# Patient Record
Sex: MALE | Race: WHITE | Employment: FULL TIME | URBAN - METROPOLITAN AREA
[De-identification: names, ages, dates, MRNs, and addresses within clinical notes are randomized per-mention and may not be internally consistent; named-entity substitution may affect disease eponyms.]

---

## 2017-02-15 PROCEDURE — 87077 CULTURE AEROBIC IDENTIFY: CPT | Performed by: PHYSICIAN ASSISTANT

## 2017-02-15 PROCEDURE — 87086 URINE CULTURE/COLONY COUNT: CPT | Performed by: PHYSICIAN ASSISTANT

## 2017-04-17 ENCOUNTER — OFFICE VISIT (OUTPATIENT)
Dept: FAMILY MEDICINE CLINIC | Facility: CLINIC | Age: 42
End: 2017-04-17

## 2017-04-17 ENCOUNTER — APPOINTMENT (OUTPATIENT)
Dept: LAB | Facility: REFERENCE LAB | Age: 42
End: 2017-04-17
Attending: FAMILY MEDICINE
Payer: COMMERCIAL

## 2017-04-17 VITALS
WEIGHT: 152 LBS | HEIGHT: 68 IN | TEMPERATURE: 98 F | RESPIRATION RATE: 16 BRPM | OXYGEN SATURATION: 98 % | HEART RATE: 110 BPM | DIASTOLIC BLOOD PRESSURE: 70 MMHG | SYSTOLIC BLOOD PRESSURE: 128 MMHG | BODY MASS INDEX: 23.04 KG/M2

## 2017-04-17 DIAGNOSIS — R21 SCROTAL RASH: Primary | ICD-10-CM

## 2017-04-17 DIAGNOSIS — K90.41 GLUTEN INTOLERANCE: ICD-10-CM

## 2017-04-17 DIAGNOSIS — H92.02 LEFT EAR PAIN: ICD-10-CM

## 2017-04-17 PROCEDURE — 83516 IMMUNOASSAY NONANTIBODY: CPT | Performed by: FAMILY MEDICINE

## 2017-04-17 PROCEDURE — 99202 OFFICE O/P NEW SF 15 MIN: CPT | Performed by: FAMILY MEDICINE

## 2017-04-17 PROCEDURE — 36415 COLL VENOUS BLD VENIPUNCTURE: CPT | Performed by: FAMILY MEDICINE

## 2017-04-17 NOTE — PROGRESS NOTES
CC:  Patient presents with:  New Patient: rash all over body x1 week  Ear Pain: left ear x 3-4 days      HPI: 36year old male here with acute rash and left ear pain. Notes the rash started about a week ago in his groin area.   He thinks it is related to c social    Drug Use: No    Sexual Activity: Yes    Partners: Female     Other Topics Concern   None on file     Social History Narrative         No current outpatient prescriptions on file.     No Known Allergies      Vitals:    04/17/17  1630   BP: 128/70

## 2017-05-09 PROBLEM — N52.02 CORPORO-VENOUS OCCLUSIVE ERECTILE DYSFUNCTION: Status: ACTIVE | Noted: 2017-05-09

## 2017-06-20 ENCOUNTER — OFFICE VISIT (OUTPATIENT)
Dept: FAMILY MEDICINE CLINIC | Facility: CLINIC | Age: 42
End: 2017-06-20

## 2017-06-20 VITALS
WEIGHT: 152 LBS | BODY MASS INDEX: 23.04 KG/M2 | DIASTOLIC BLOOD PRESSURE: 80 MMHG | RESPIRATION RATE: 16 BRPM | OXYGEN SATURATION: 98 % | HEIGHT: 68 IN | HEART RATE: 112 BPM | TEMPERATURE: 98 F | SYSTOLIC BLOOD PRESSURE: 134 MMHG

## 2017-06-20 DIAGNOSIS — L24.9 IRRITANT CONTACT DERMATITIS, UNSPECIFIED TRIGGER: Primary | ICD-10-CM

## 2017-06-20 PROCEDURE — 99213 OFFICE O/P EST LOW 20 MIN: CPT | Performed by: FAMILY MEDICINE

## 2017-06-20 NOTE — PATIENT INSTRUCTIONS
Understanding Contact Dermatitis     A cool, moist compress can help reduce itching. Contact dermatitis is a common type of skin rash. It’s caused by something that touches the skin and makes it irritated and inflamed.  It can occur on skin on any par · Cool, moist compress. Use a clean damp cloth. Put it on the area for 20 to 30 minutes, 5 to 6 times a day for the first 3 days. · Steroid cream or ointment. You can apply this medicine several times a day on clean skin. · Oral corticosteroid.  Your heal

## 2017-06-20 NOTE — PROGRESS NOTES
CC:  Patient presents with:  Rash: on his and legs x1 week      HPI: 36year old male here for rash on his legs and back x 1 week. Slept at a friend's house one week ago and noticed a new rash on his back and his legs the next morning.   Notes his whole ba 60 g Rfl: 0   Sildenafil Citrate (VIAGRA) 50 MG Oral Tab Take 1 tablet (50 mg total) by mouth as needed for Erectile Dysfunction.  Disp: 2 tablet Rfl: 0       No Known Allergies      Vitals:    06/20/17  1546   BP: 134/80   Pulse: 112   Temp: 98.4 °F (36.9

## 2017-06-27 PROCEDURE — 81001 URINALYSIS AUTO W/SCOPE: CPT | Performed by: UROLOGY

## 2017-07-31 ENCOUNTER — OFFICE VISIT (OUTPATIENT)
Dept: FAMILY MEDICINE CLINIC | Facility: CLINIC | Age: 42
End: 2017-07-31

## 2017-07-31 VITALS
SYSTOLIC BLOOD PRESSURE: 116 MMHG | RESPIRATION RATE: 18 BRPM | HEIGHT: 68 IN | DIASTOLIC BLOOD PRESSURE: 70 MMHG | HEART RATE: 108 BPM | WEIGHT: 148 LBS | OXYGEN SATURATION: 98 % | BODY MASS INDEX: 22.43 KG/M2

## 2017-07-31 DIAGNOSIS — Z11.3 VENEREAL DISEASE SCREENING: ICD-10-CM

## 2017-07-31 DIAGNOSIS — R35.89 POLYURIA: ICD-10-CM

## 2017-07-31 DIAGNOSIS — M25.471 EDEMA OF RIGHT ANKLE: ICD-10-CM

## 2017-07-31 DIAGNOSIS — Z00.01 ENCOUNTER FOR ROUTINE ADULT HEALTH EXAMINATION WITH ABNORMAL FINDINGS: Primary | ICD-10-CM

## 2017-07-31 DIAGNOSIS — Z13.220 LIPID SCREENING: ICD-10-CM

## 2017-07-31 PROCEDURE — 99396 PREV VISIT EST AGE 40-64: CPT | Performed by: FAMILY MEDICINE

## 2017-07-31 NOTE — PATIENT INSTRUCTIONS
Prevention Guidelines, Men Ages 36 to 52  Screening tests and vaccines are an important part of managing your health. Health counseling is essential, too. Below are guidelines for these, for men ages 36 to 52.  Talk with your healthcare provider to make s Hepatitis A Men at increased risk for infection – talk with your healthcare provider 2 doses given at least 6 months apart   Hepatitis B Men at increased risk for infection – talk with your healthcare provider 3 doses over 6 months; second dose should be g © 8477-6568 [address], [address] [place]. All rights reserved. This information is not intended as a substitute for professional medical care. Always follow your healthcare professional's instructions.

## 2017-07-31 NOTE — PROGRESS NOTES
Seth Mayfield is a 36year old male who presents for a complete physical exam.   HPI:     Concerns: Right ankle swelling off and on for about a week. Denies any ankle pain or rash, no calf pain. Worsens at the end of the day.    Reports he has been urinating growth   2004: OTHER SURGICAL HISTORY      Comment: scoliosis repair   11/22/16: OTHER SURGICAL HISTORY      Comment: cysto- Dr. Arnold Nolan   Family History   Problem Relation Age of Onset   • Diabetes Father    • Heart Disorder Father      CABG   • Hypertension intact    No results found for: CHOLEST, HDL, LDL, TRIGLY, AST, ALT   ASSESSMENT AND PLAN:   Rc Blas is a 36year old male who presents for a complete physical exam.    Encounter for routine adult health examination with abnormal findings  (primary en

## 2017-08-04 ENCOUNTER — APPOINTMENT (OUTPATIENT)
Dept: LAB | Facility: REFERENCE LAB | Age: 42
End: 2017-08-04
Attending: FAMILY MEDICINE
Payer: COMMERCIAL

## 2017-08-04 DIAGNOSIS — R35.89 POLYURIA: ICD-10-CM

## 2017-08-04 DIAGNOSIS — Z11.3 VENEREAL DISEASE SCREENING: ICD-10-CM

## 2017-08-04 DIAGNOSIS — M25.471 EDEMA OF RIGHT ANKLE: ICD-10-CM

## 2017-08-04 DIAGNOSIS — Z13.220 LIPID SCREENING: ICD-10-CM

## 2017-08-04 LAB
ANION GAP SERPL CALC-SCNC: 7 MMOL/L (ref 0–18)
BACTERIA UR QL AUTO: NEGATIVE /HPF
BACTERIA UR QL AUTO: NEGATIVE /HPF
BILIRUB UR QL: NEGATIVE
BUN SERPL-MCNC: 19 MG/DL (ref 8–20)
BUN/CREAT SERPL: 24.7 (ref 10–20)
CALCIUM SERPL-MCNC: 9.3 MG/DL (ref 8.5–10.5)
CHLORIDE SERPL-SCNC: 103 MMOL/L (ref 95–110)
CHOLEST SERPL-MCNC: 164 MG/DL (ref 110–200)
CLARITY UR: CLEAR
CO2 SERPL-SCNC: 29 MMOL/L (ref 22–32)
COLOR UR: YELLOW
CREAT SERPL-MCNC: 0.77 MG/DL (ref 0.5–1.5)
GLUCOSE SERPL-MCNC: 98 MG/DL (ref 70–99)
GLUCOSE UR-MCNC: NEGATIVE MG/DL
HDLC SERPL-MCNC: 62 MG/DL
HGB UR QL STRIP.AUTO: NEGATIVE
KETONES UR-MCNC: NEGATIVE MG/DL
LDLC SERPL CALC-MCNC: 87 MG/DL (ref 0–99)
LEUKOCYTE ESTERASE UR QL STRIP.AUTO: NEGATIVE
NITRITE UR QL STRIP.AUTO: NEGATIVE
NONHDLC SERPL-MCNC: 102 MG/DL
OSMOLALITY UR CALC.SUM OF ELEC: 290 MOSM/KG (ref 275–295)
PH UR: 6 [PH] (ref 5–8)
POTASSIUM SERPL-SCNC: 4 MMOL/L (ref 3.3–5.1)
PROT UR-MCNC: NEGATIVE MG/DL
RBC #/AREA URNS AUTO: <1 /HPF
RBC #/AREA URNS AUTO: <1 /HPF
SODIUM SERPL-SCNC: 139 MMOL/L (ref 136–144)
SP GR UR STRIP: 1.02 (ref 1–1.03)
TRIGL SERPL-MCNC: 77 MG/DL (ref 1–149)
UROBILINOGEN UR STRIP-ACNC: <2
VIT C UR-MCNC: NEGATIVE MG/DL
WBC #/AREA URNS AUTO: 1 /HPF
WBC #/AREA URNS AUTO: 1 /HPF

## 2017-08-04 PROCEDURE — 87389 HIV-1 AG W/HIV-1&-2 AB AG IA: CPT | Performed by: FAMILY MEDICINE

## 2017-08-04 PROCEDURE — 86780 TREPONEMA PALLIDUM: CPT | Performed by: FAMILY MEDICINE

## 2017-08-04 PROCEDURE — 36415 COLL VENOUS BLD VENIPUNCTURE: CPT | Performed by: FAMILY MEDICINE

## 2017-08-04 PROCEDURE — 81001 URINALYSIS AUTO W/SCOPE: CPT | Performed by: FAMILY MEDICINE

## 2017-08-04 PROCEDURE — 80048 BASIC METABOLIC PNL TOTAL CA: CPT | Performed by: FAMILY MEDICINE

## 2017-08-04 PROCEDURE — 80061 LIPID PANEL: CPT | Performed by: FAMILY MEDICINE

## 2017-08-06 LAB
C TRACH DNA SPEC QL NAA+PROBE: NEGATIVE
N GONORRHOEA DNA SPEC QL NAA+PROBE: NEGATIVE

## 2017-08-07 LAB — HIV1+2 AB SERPL QL IA: NONREACTIVE

## 2017-08-08 DIAGNOSIS — M25.473 ANKLE EDEMA: ICD-10-CM

## 2017-08-08 DIAGNOSIS — R01.1 SYSTOLIC EJECTION MURMUR: Primary | ICD-10-CM

## 2017-08-08 LAB — T PALLIDUM AB SER QL: NEGATIVE

## 2017-08-15 NOTE — TELEPHONE ENCOUNTER
Patient itching on arms and legs for three - four days. Wants to know if he should come in or get something over the counter.

## 2017-08-15 NOTE — TELEPHONE ENCOUNTER
Patient concerned with possible bed bug infestation that he caught while at a hotel in [place].  Note he and his girlfriend have had bugs on them every morning since being back on Saturday, deny any bites or marks and go away with showering but does have

## 2017-10-26 ENCOUNTER — HOSPITAL ENCOUNTER (OUTPATIENT)
Dept: CV DIAGNOSTICS | Facility: HOSPITAL | Age: 42
Discharge: HOME | End: 2017-10-26
Attending: FAMILY MEDICINE
Payer: COMMERCIAL

## 2017-10-26 DIAGNOSIS — M25.473 ANKLE EDEMA: ICD-10-CM

## 2017-10-26 DIAGNOSIS — R01.1 SYSTOLIC EJECTION MURMUR: ICD-10-CM

## 2017-10-26 PROCEDURE — 93306 TTE W/DOPPLER COMPLETE: CPT | Performed by: FAMILY MEDICINE

## 2017-11-03 PROCEDURE — 87086 URINE CULTURE/COLONY COUNT: CPT | Performed by: PHYSICIAN ASSISTANT

## 2017-12-21 PROCEDURE — 87086 URINE CULTURE/COLONY COUNT: CPT | Performed by: OBSTETRICS & GYNECOLOGY

## 2017-12-23 ENCOUNTER — HOSPITAL ENCOUNTER (OUTPATIENT)
Age: 42
Discharge: HOME | End: 2017-12-23
Attending: FAMILY MEDICINE
Payer: COMMERCIAL

## 2017-12-23 VITALS
DIASTOLIC BLOOD PRESSURE: 89 MMHG | WEIGHT: 150 LBS | HEIGHT: 68 IN | BODY MASS INDEX: 22.73 KG/M2 | OXYGEN SATURATION: 100 % | RESPIRATION RATE: 22 BRPM | HEART RATE: 102 BPM | SYSTOLIC BLOOD PRESSURE: 139 MMHG | TEMPERATURE: 97 F

## 2017-12-23 DIAGNOSIS — J06.9 VIRAL UPPER RESPIRATORY TRACT INFECTION: Primary | ICD-10-CM

## 2017-12-23 PROCEDURE — 99213 OFFICE O/P EST LOW 20 MIN: CPT

## 2017-12-23 PROCEDURE — 99204 OFFICE O/P NEW MOD 45 MIN: CPT

## 2017-12-23 RX ORDER — BENZONATATE 200 MG/1
200 CAPSULE ORAL 3 TIMES DAILY PRN
Qty: 21 CAPSULE | Refills: 0 | Status: SHIPPED | OUTPATIENT
Start: 2017-12-23 | End: 2017-12-28 | Stop reason: ALTCHOICE

## 2017-12-23 NOTE — ED PROVIDER NOTES
Patient Seen [address]: [address]    History   Patient presents with:  Cough/URI    Stated Complaint: cough    HPI    36year old patient with PMHx significant for Scoliosis presents with cough, congestion for 3 days.      Cough Family history reviewed and not pertinent to today's presenting problem  Smoking status: Never Smoker                                                              Smokeless tobacco: Never Used                      Alcohol use: Yes           3.6 oz/week upper respiratory tract infection  (primary encounter diagnosis)    Disposition:  Discharge    Follow-up:  Cheryl Burris, [address] [facility],[address] 21 [phone]    In 3 days  if no improvement or sooner if worse      Medications Pre

## 2017-12-25 ENCOUNTER — HOSPITAL ENCOUNTER (OUTPATIENT)
Age: 42
Discharge: HOME | End: 2017-12-25
Attending: FAMILY MEDICINE
Payer: COMMERCIAL

## 2017-12-25 VITALS
SYSTOLIC BLOOD PRESSURE: 119 MMHG | TEMPERATURE: 98 F | DIASTOLIC BLOOD PRESSURE: 70 MMHG | HEIGHT: 68 IN | RESPIRATION RATE: 20 BRPM | HEART RATE: 108 BPM | WEIGHT: 150 LBS | OXYGEN SATURATION: 100 % | BODY MASS INDEX: 22.73 KG/M2

## 2017-12-25 DIAGNOSIS — J06.9 VIRAL UPPER RESPIRATORY TRACT INFECTION: Primary | ICD-10-CM

## 2017-12-25 PROCEDURE — 99212 OFFICE O/P EST SF 10 MIN: CPT

## 2017-12-25 NOTE — ED PROVIDER NOTES
Patient Seen in: [address] [facility]    History   Patient presents with:  Cough/URI    Stated Complaint: Cough    HPI    Pt is a 35 yo with nasal congestion and cough. There are no fevers, throat pain or ear pain.  He is using fei Oropharynx is clear and moist.   Nasal congestion, clear   Eyes: Conjunctivae and EOM are normal. Pupils are equal, round, and reactive to light. Neck: Normal range of motion. Neck supple. Cardiovascular: Normal rate and regular rhythm.     Pulmonary/Ch

## 2017-12-28 ENCOUNTER — OFFICE VISIT (OUTPATIENT)
Dept: FAMILY MEDICINE CLINIC | Facility: CLINIC | Age: 42
End: 2017-12-28

## 2017-12-28 VITALS
WEIGHT: 150 LBS | TEMPERATURE: 99 F | SYSTOLIC BLOOD PRESSURE: 114 MMHG | DIASTOLIC BLOOD PRESSURE: 72 MMHG | OXYGEN SATURATION: 98 % | HEIGHT: 68 IN | HEART RATE: 88 BPM | BODY MASS INDEX: 22.73 KG/M2

## 2017-12-28 DIAGNOSIS — R11.10 POST-TUSSIVE EMESIS: Primary | ICD-10-CM

## 2017-12-28 PROCEDURE — 99213 OFFICE O/P EST LOW 20 MIN: CPT | Performed by: FAMILY MEDICINE

## 2017-12-28 PROCEDURE — 87798 DETECT AGENT NOS DNA AMP: CPT | Performed by: FAMILY MEDICINE

## 2017-12-28 RX ORDER — CODEINE PHOSPHATE AND GUAIFENESIN 10; 100 MG/5ML; MG/5ML
5 SOLUTION ORAL EVERY 6 HOURS PRN
Qty: 1 BOTTLE | Refills: 0 | Status: SHIPPED | OUTPATIENT
Start: 2017-12-28 | End: 2018-01-02

## 2017-12-28 RX ORDER — AZITHROMYCIN 250 MG/1
TABLET, FILM COATED ORAL
Qty: 6 TABLET | Refills: 0 | Status: SHIPPED | OUTPATIENT
Start: 2017-12-28 | End: 2018-02-23 | Stop reason: ALTCHOICE

## 2017-12-28 NOTE — PROGRESS NOTES
CC:  Patient presents with:  Cough: x1.5 week, feels like its getting worse      HPI: 36year old male here with cough x 1.5 weeks that is getting worse. Went to walk-in clinic and immediate care due to chronic cough but has not gotten any better.   Was gi on file     Social History Narrative   None on file         Current Outpatient Prescriptions:  Pseudoephedrine-Guaifenesin (NASAL DECONGESTANT OR) Take by mouth.  Disp:  Rfl:    benzonatate 200 MG Oral Cap Take 1 capsule (200 mg total) by mouth 3 (three) ti

## 2018-01-21 ENCOUNTER — HOSPITAL ENCOUNTER (OUTPATIENT)
Age: 43
Discharge: HOME | End: 2018-01-21
Attending: PEDIATRICS
Payer: COMMERCIAL

## 2018-01-21 VITALS
HEART RATE: 103 BPM | HEIGHT: 68 IN | RESPIRATION RATE: 16 BRPM | TEMPERATURE: 97 F | BODY MASS INDEX: 22.73 KG/M2 | DIASTOLIC BLOOD PRESSURE: 76 MMHG | WEIGHT: 150 LBS | OXYGEN SATURATION: 100 % | SYSTOLIC BLOOD PRESSURE: 123 MMHG

## 2018-01-21 DIAGNOSIS — R23.2 FACIAL FLUSHING: Primary | ICD-10-CM

## 2018-01-21 PROCEDURE — 99212 OFFICE O/P EST SF 10 MIN: CPT

## 2018-01-21 PROCEDURE — 99213 OFFICE O/P EST LOW 20 MIN: CPT

## 2018-01-21 NOTE — ED PROVIDER NOTES
Patient Seen in: [facility]    History   Patient presents with: Allergic Rxn Allergies (immune)    Stated Complaint: ALLERGIC REACTION    HPI    78-year-old male presents a few hours after having a reaction after lunch. Never Smoker                                                              Smokeless tobacco: Never Used                      Alcohol use: Yes           3.6 oz/week     Cans of beer: 6 per week     Comment: social      Review of Systems    Positive for stat needed      Medications Prescribed:  Current Discharge Medication List

## 2018-01-21 NOTE — ED INITIAL ASSESSMENT (HPI)
Pt reports was eating seafood around noon and started to get red/flushed also states was unable to urinate at the time. Pt denies any hives denies SOB no drooling or stridor noted. Pt states has eaten seafood in the past with no problems or reactions.  Pt h

## 2018-03-26 ENCOUNTER — OFFICE VISIT (OUTPATIENT)
Dept: FAMILY MEDICINE CLINIC | Facility: CLINIC | Age: 43
End: 2018-03-26

## 2018-03-26 VITALS
HEART RATE: 117 BPM | DIASTOLIC BLOOD PRESSURE: 70 MMHG | OXYGEN SATURATION: 98 % | SYSTOLIC BLOOD PRESSURE: 116 MMHG | BODY MASS INDEX: 22.73 KG/M2 | WEIGHT: 150 LBS | HEIGHT: 68 IN

## 2018-03-26 DIAGNOSIS — N50.812 LEFT TESTICULAR PAIN: Primary | ICD-10-CM

## 2018-03-26 DIAGNOSIS — G57.92 ILIOINGUINAL NEURALGIA OF LEFT SIDE: ICD-10-CM

## 2018-03-26 DIAGNOSIS — M54.16 LUMBAR RADICULOPATHY: ICD-10-CM

## 2018-03-26 PROCEDURE — 99213 OFFICE O/P EST LOW 20 MIN: CPT | Performed by: FAMILY MEDICINE

## 2018-03-26 RX ORDER — NAPROXEN 500 MG/1
500 TABLET ORAL 2 TIMES DAILY WITH MEALS
Qty: 28 TABLET | Refills: 0 | Status: SHIPPED | OUTPATIENT
Start: 2018-03-26 | End: 2018-04-09

## 2018-03-26 NOTE — PATIENT INSTRUCTIONS
Testicular Pain, Unclear Cause  You have had pain in one or both testicles. Based on your exam today, the exact cause of your pain is not certain. But your condition does not appear to be dangerous. Testicles are very sensitive.  Even a small injury can c · Numbness or weakness in the leg  · Shrinking of the testicle  · Blood in your urine  Date Last Reviewed: 10/1/2016  © 8083-8751 [employer]. [address] [facility], [address] [place] [address]. All rights reserved.  This information is not intended as

## 2018-03-26 NOTE — PROGRESS NOTES
aCC:  Patient presents with:  Leg Pain: left leg pain that shoots down his leg started on friday night, no injuries      HPI: 39year old male here to discuss left leg shooting pain that started on Friday.   Notes he was sitting down watching TV when he got Years of education: N/A  Number of children: N/A     Occupational History  None on file     Social History Main Topics   Smoking status: Never Smoker    Smokeless tobacco: Never Used    Alcohol use Yes  3.6 oz/week    6 Cans of beer per week         Commen physical therapy  - Referred to Neuroscience PT department at [address] [place] [address] William Jade,   03/26/18  12:47 PM

## 2018-03-27 ENCOUNTER — HOSPITAL ENCOUNTER (OUTPATIENT)
Dept: ULTRASOUND IMAGING | Age: 43
Discharge: HOME | End: 2018-03-27
Attending: FAMILY MEDICINE
Payer: COMMERCIAL

## 2018-03-27 DIAGNOSIS — N50.812 LEFT TESTICULAR PAIN: ICD-10-CM

## 2018-03-27 PROCEDURE — 93975 VASCULAR STUDY: CPT | Performed by: FAMILY MEDICINE

## 2018-03-27 PROCEDURE — 76870 US EXAM SCROTUM: CPT | Performed by: FAMILY MEDICINE

## 2018-04-23 ENCOUNTER — TELEPHONE (OUTPATIENT)
Dept: PHYSICAL THERAPY | Age: 43
End: 2018-04-23

## 2018-05-03 ENCOUNTER — APPOINTMENT (OUTPATIENT)
Dept: PHYSICAL THERAPY | Age: 43
End: 2018-05-03
Attending: FAMILY MEDICINE
Payer: COMMERCIAL

## 2018-05-03 PROCEDURE — 87086 URINE CULTURE/COLONY COUNT: CPT | Performed by: OBSTETRICS & GYNECOLOGY

## 2018-05-03 PROCEDURE — 81015 MICROSCOPIC EXAM OF URINE: CPT | Performed by: OBSTETRICS & GYNECOLOGY

## 2018-05-10 ENCOUNTER — OFFICE VISIT (OUTPATIENT)
Dept: PHYSICAL THERAPY | Age: 43
End: 2018-05-10
Attending: FAMILY MEDICINE
Payer: COMMERCIAL

## 2018-05-10 DIAGNOSIS — G57.92 ILIOINGUINAL NEURALGIA OF LEFT SIDE: ICD-10-CM

## 2018-05-10 DIAGNOSIS — M54.16 LUMBAR RADICULOPATHY: ICD-10-CM

## 2018-05-10 PROCEDURE — 97161 PT EVAL LOW COMPLEX 20 MIN: CPT

## 2018-05-10 PROCEDURE — 97110 THERAPEUTIC EXERCISES: CPT

## 2018-05-10 NOTE — PROGRESS NOTES
PELVIC FLOOR EVALUATION:   Referring Physician: Dr. Marino Mckeon  Diagnosis: Ilioinguinal neuralgia of left side (G57.92)  Lumbar radiculopathy (M54.16)  Date of Onset: 1.5 years prior Date of Service: 5/101/18     PATIENT SUMMARY   Patient is a 39year old y/o with pain while lying down. Functional limitations include painful urination, painful intercourse, and sleeping.  Objective testing demonstrates decreased ROM in L hip, TTP in L groin and adductors, decreased LE strength, poor hydration habits, and poor loren months  Pressure complaints: no  Urinary Frequency: every 2 hours  Urine Stream: split stream  Amount: normal  Leaking occurs: NA  Episodes of Leakage: NA  Amount of leakage: NA  Pad use: NA  Pad Change frequency: NA  Nocturia: no  Fluid Intake: 40 oz of w Squat Decreased*pain Decreased *pain    Single Leg Squat Decreased Decreased    Single Leg Balance NT  NT      Pelvis  L upslip of ilium, posterior rotation       PLAN OF CARE:      Today’s Treatment and Response:  Patient education provided on objective f

## 2018-05-17 ENCOUNTER — OFFICE VISIT (OUTPATIENT)
Dept: PHYSICAL THERAPY | Age: 43
End: 2018-05-17
Attending: FAMILY MEDICINE
Payer: COMMERCIAL

## 2018-05-17 DIAGNOSIS — M54.16 LUMBAR RADICULOPATHY: ICD-10-CM

## 2018-05-17 DIAGNOSIS — G57.92 ILIOINGUINAL NEURALGIA OF LEFT SIDE: ICD-10-CM

## 2018-05-17 PROCEDURE — 97110 THERAPEUTIC EXERCISES: CPT

## 2018-05-17 PROCEDURE — 97140 MANUAL THERAPY 1/> REGIONS: CPT

## 2018-05-17 NOTE — PROGRESS NOTES
Diagnosis: Ilioinguinal neuralgia of left side (G57.92)  Lumbar radiculopathy (M54.16)     Authorized # of Visits: 10       Next MD visit: not made  Referring Physician: Dr. Chet Easton  Fall Risk: standard         Precautions: n/a           Medication Changes si

## 2018-05-24 ENCOUNTER — OFFICE VISIT (OUTPATIENT)
Dept: PHYSICAL THERAPY | Age: 43
End: 2018-05-24
Attending: FAMILY MEDICINE
Payer: COMMERCIAL

## 2018-05-24 DIAGNOSIS — M54.16 LUMBAR RADICULOPATHY: ICD-10-CM

## 2018-05-24 DIAGNOSIS — G57.92 ILIOINGUINAL NEURALGIA OF LEFT SIDE: ICD-10-CM

## 2018-05-24 PROCEDURE — 97140 MANUAL THERAPY 1/> REGIONS: CPT

## 2018-05-24 PROCEDURE — 97110 THERAPEUTIC EXERCISES: CPT

## 2018-05-24 NOTE — PROGRESS NOTES
Diagnosis: Ilioinguinal neuralgia of left side (G57.92)  Lumbar radiculopathy (M54.16)     Authorized # of Visits: 10       Next MD visit: not made  Referring Physician: Dr. Royer Meier  Fall Risk: standard         Precautions: n/a           Medication Changes si

## 2018-05-31 ENCOUNTER — OFFICE VISIT (OUTPATIENT)
Dept: PHYSICAL THERAPY | Age: 43
End: 2018-05-31
Attending: FAMILY MEDICINE
Payer: COMMERCIAL

## 2018-05-31 DIAGNOSIS — G57.92 ILIOINGUINAL NEURALGIA OF LEFT SIDE: ICD-10-CM

## 2018-05-31 DIAGNOSIS — M54.16 LUMBAR RADICULOPATHY: ICD-10-CM

## 2018-05-31 PROCEDURE — 97110 THERAPEUTIC EXERCISES: CPT

## 2018-05-31 PROCEDURE — 97140 MANUAL THERAPY 1/> REGIONS: CPT

## 2018-05-31 NOTE — PROGRESS NOTES
Diagnosis: Ilioinguinal neuralgia of left side (G57.92)  Lumbar radiculopathy (M54.16)     Authorized # of Visits: 10       Next MD visit: not made  Referring Physician: Dr. Selam Murray  Fall Risk: standard         Precautions: n/a           Medication Changes si Charges: 1MT, 2TE

## 2018-06-07 ENCOUNTER — OFFICE VISIT (OUTPATIENT)
Dept: PHYSICAL THERAPY | Age: 43
End: 2018-06-07
Attending: FAMILY MEDICINE
Payer: COMMERCIAL

## 2018-06-07 DIAGNOSIS — M54.16 LUMBAR RADICULOPATHY: ICD-10-CM

## 2018-06-07 DIAGNOSIS — G57.92 ILIOINGUINAL NEURALGIA OF LEFT SIDE: ICD-10-CM

## 2018-06-07 PROCEDURE — 97110 THERAPEUTIC EXERCISES: CPT

## 2018-06-07 PROCEDURE — 97140 MANUAL THERAPY 1/> REGIONS: CPT

## 2018-06-07 NOTE — PROGRESS NOTES
Diagnosis: Ilioinguinal neuralgia of left side (G57.92)  Lumbar radiculopathy (M54.16)     Authorized # of Visits: 10       Next MD visit: not made  Referring Physician: Dr. Kathy Culver  Fall Risk: standard         Precautions: n/a           Medication Changes si painfree intercourse               4. Pt to improve FOTO score to less than 5% impaired  Assessment: Patient presents with improved strength. Added more core stabilization in seated and standing position. Decreased pain for 1-2 days post therapy.   Discusse

## 2018-06-14 ENCOUNTER — OFFICE VISIT (OUTPATIENT)
Dept: PHYSICAL THERAPY | Age: 43
End: 2018-06-14
Attending: FAMILY MEDICINE
Payer: COMMERCIAL

## 2018-06-14 DIAGNOSIS — M54.16 LUMBAR RADICULOPATHY: ICD-10-CM

## 2018-06-14 DIAGNOSIS — G57.92 ILIOINGUINAL NEURALGIA OF LEFT SIDE: ICD-10-CM

## 2018-06-14 PROCEDURE — 97112 NEUROMUSCULAR REEDUCATION: CPT

## 2018-06-14 PROCEDURE — 97140 MANUAL THERAPY 1/> REGIONS: CPT

## 2018-06-14 PROCEDURE — 97110 THERAPEUTIC EXERCISES: CPT

## 2018-06-14 NOTE — PROGRESS NOTES
Diagnosis: Ilioinguinal neuralgia of left side (G57.92)  Lumbar radiculopathy (M54.16)     Authorized # of Visits: 10       Next MD visit: not made  Referring Physician: Dr. Chet Easton  Fall Risk: standard         Precautions: n/a           Medication Change onto BOSU 20x   Self-Care                Goals:  Long Term Goals (Time Frame 8 visits) by 8/10/18               1. Pt to report reduction of pain with sleeping               2. Pt to demonstrate improved body mechanics with toileting               3. Pt to

## 2018-06-21 ENCOUNTER — OFFICE VISIT (OUTPATIENT)
Dept: PHYSICAL THERAPY | Age: 43
End: 2018-06-21
Attending: FAMILY MEDICINE
Payer: COMMERCIAL

## 2018-06-21 PROCEDURE — 97112 NEUROMUSCULAR REEDUCATION: CPT

## 2018-06-21 PROCEDURE — 97110 THERAPEUTIC EXERCISES: CPT

## 2018-06-21 PROCEDURE — 97140 MANUAL THERAPY 1/> REGIONS: CPT

## 2018-06-21 NOTE — PROGRESS NOTES
Diagnosis: Ilioinguinal neuralgia of left side (G57.92)  Lumbar radiculopathy (M54.16)     Authorized # of Visits: 10       Next MD visit: not made  Referring Physician: Dr. Clark ref.  provider found  Fall Risk: standard         Precautions: n/a 20x 1. Bridges on SB 20x  2. Clams 2 x 10  3. SB marches 20x  4. SL kicks on foam 10 sets  5. SB seated ant/post tilts 20x  6. Side steps red band 3 laps at mirror  7. Ball squats 20x  8 side lunges onto BOSU 20x 1. Bridges on SB 20x  2.  S/l IR/ER 20x each

## 2018-06-28 ENCOUNTER — OFFICE VISIT (OUTPATIENT)
Dept: PHYSICAL THERAPY | Age: 43
End: 2018-06-28
Attending: FAMILY MEDICINE
Payer: COMMERCIAL

## 2018-06-28 PROCEDURE — 97110 THERAPEUTIC EXERCISES: CPT

## 2018-06-28 PROCEDURE — 97140 MANUAL THERAPY 1/> REGIONS: CPT

## 2018-06-28 PROCEDURE — 97112 NEUROMUSCULAR REEDUCATION: CPT

## 2018-06-28 NOTE — PROGRESS NOTES
Diagnosis: Ilioinguinal neuralgia of left side (G57.92)  Lumbar radiculopathy (M54.16)    Authorized # of Visits: 10       Next MD visit: not made  Referring Physician:   Fall Risk: standard         Precautions: n/a           Medication Changes SB 20x  2. Clams 2 x 10  3. SB marches 20x  4. SL kicks on foam 10 sets  5. SB seated ant/post tilts 20x 1. Bridges on SB 20x  2. Clams 2 x 10  3. SB marches 20x  4. SL kicks on foam 10 sets  5. SB seated ant/post tilts 20x  6.  Side steps red band 3 laps a

## 2018-07-05 ENCOUNTER — OFFICE VISIT (OUTPATIENT)
Dept: PHYSICAL THERAPY | Age: 43
End: 2018-07-05
Attending: FAMILY MEDICINE
Payer: COMMERCIAL

## 2018-07-05 PROCEDURE — 97110 THERAPEUTIC EXERCISES: CPT

## 2018-07-05 PROCEDURE — 97140 MANUAL THERAPY 1/> REGIONS: CPT

## 2018-07-05 PROCEDURE — 97112 NEUROMUSCULAR REEDUCATION: CPT

## 2018-07-05 NOTE — PROGRESS NOTES
Diagnosis: Ilioinguinal neuralgia of left side (G57.92)  Lumbar radiculopathy (M54.16)    Authorized # of Visits: 10       Next MD visit: not made  Referring Physician:   Fall Risk: standard         Precautions: n/a           Medication Changes [facility] x 10 1. Bridges with add sqz 20x  2. Clams 2 x 10 1. Bridges with add sqz 20x  2. Clams 2 x 10 1. Bridges on SB 20x  2. Clams 2 x 10  3. SB marches 20x  4. SL kicks on foam 10 sets  5. SB seated ant/post tilts 20x 1. Bridges on SB 20x  2. Clams 2 x 10  3.

## 2018-07-12 ENCOUNTER — OFFICE VISIT (OUTPATIENT)
Dept: PHYSICAL THERAPY | Age: 43
End: 2018-07-12
Attending: FAMILY MEDICINE
Payer: COMMERCIAL

## 2018-07-12 PROCEDURE — 97110 THERAPEUTIC EXERCISES: CPT

## 2018-07-12 PROCEDURE — 97112 NEUROMUSCULAR REEDUCATION: CPT

## 2018-07-12 NOTE — PROGRESS NOTES
Patient Name: Maximino Harper, : 3/21/1975, MRN: [id number]   Date:  2018  Referring Physician:  Dr. Shiloh Martinez    Diagnosis: Ilioinguinal neuralgia of left side (G57.92)  Lumbar radiculopathy (M54.16)    Discharge Summary  Pt has attended 10, cancelled 0, (G57.92)  Lumbar radiculopathy (M54.16)    Authorized # of Visits: 10       Next MD visit: not made  Referring Physician:   Fall Risk: standard         Precautions: n/a           Medication Changes since last visit?: No  SUBJECTIVE: see d  OBJECTIVE x 10 1. Bridges with add sqz 20x  2. Clams 2 x 10 1. Bridges with add sqz 20x  2. Clams 2 x 10 1. Bridges on SB 20x  2. Clams 2 x 10  3. SB marches 20x  4. SL kicks on foam 10 sets  5. SB seated ant/post tilts 20x 1. Bridges on SB 20x  2. Clams 2 x 10  3.

## 2018-08-07 ENCOUNTER — OFFICE VISIT (OUTPATIENT)
Dept: FAMILY MEDICINE CLINIC | Facility: CLINIC | Age: 43
End: 2018-08-07
Payer: COMMERCIAL

## 2018-08-07 VITALS
DIASTOLIC BLOOD PRESSURE: 80 MMHG | WEIGHT: 155 LBS | HEIGHT: 68 IN | BODY MASS INDEX: 23.49 KG/M2 | HEART RATE: 100 BPM | SYSTOLIC BLOOD PRESSURE: 116 MMHG | OXYGEN SATURATION: 100 %

## 2018-08-07 DIAGNOSIS — G57.92 ILIOINGUINAL NEURALGIA OF LEFT SIDE: Primary | ICD-10-CM

## 2018-08-07 DIAGNOSIS — F40.243 FEAR OF FLYING: ICD-10-CM

## 2018-08-07 DIAGNOSIS — R30.0 DYSURIA: ICD-10-CM

## 2018-08-07 LAB
BACTERIA UR QL AUTO: NEGATIVE /HPF
BILIRUBIN URINE: NEGATIVE
BLOOD URINE: NEGATIVE
CONTROL RUN WITHIN 24 HOURS?: YES
GLUCOSE URINE: NEGATIVE
KETONE URINE: NEGATIVE
LEUKOCYTE ESTERASE URINE: NEGATIVE
NITRITE URINE: NEGATIVE
PH URINE: 8.5 (ref 5–8)
RBC #/AREA URNS AUTO: 2 /HPF
SPEC GRAVITY: 1.02 (ref 1–1.03)
URINE CLARITY: CLEAR
URINE COLOR: YELLOW
UROBILINOGEN URINE: 1
WBC #/AREA URNS AUTO: 1 /HPF

## 2018-08-07 PROCEDURE — 87591 N.GONORRHOEAE DNA AMP PROB: CPT | Performed by: FAMILY MEDICINE

## 2018-08-07 PROCEDURE — 81015 MICROSCOPIC EXAM OF URINE: CPT | Performed by: FAMILY MEDICINE

## 2018-08-07 PROCEDURE — 87491 CHLMYD TRACH DNA AMP PROBE: CPT | Performed by: FAMILY MEDICINE

## 2018-08-07 PROCEDURE — 99214 OFFICE O/P EST MOD 30 MIN: CPT | Performed by: FAMILY MEDICINE

## 2018-08-07 RX ORDER — CLONAZEPAM 0.5 MG/1
0.5 TABLET ORAL DAILY PRN
Qty: 10 TABLET | Refills: 0 | Status: SHIPPED | OUTPATIENT
Start: 2018-08-07 | End: 2018-10-04 | Stop reason: ALTCHOICE

## 2018-08-07 NOTE — PROGRESS NOTES
CC:  Patient presents with:  Urinary Frequency: with some burning sensation for the past month      HPI: 39year old male here with dysuria and increased urinary frequency x 1 month.   Saw a [address] [place] [address] specialist in June who did not find a cause Spouse name: N/A    Years of education: N/A  Number of children: N/A     Occupational History  None on file     Social History Main Topics   Smoking status: Never Smoker    Smokeless tobacco: Never Used    Alcohol use Yes  3.6 oz/week    6 Cans of beer per CHLAMYDIA/GONOCOCCUS, RICKEY; Future  - URINE MICROSCOPIC W REFLEX CULTURE; Future    3.  Fear of flying    - Short course of Clonazepam provided as needed for upcoming flight    A total of 25 minutes of this visit were spent face to face with the patient and

## 2018-08-08 LAB
C TRACH DNA SPEC QL NAA+PROBE: NEGATIVE
N GONORRHOEA DNA SPEC QL NAA+PROBE: NEGATIVE

## 2018-08-12 ENCOUNTER — PATIENT MESSAGE (OUTPATIENT)
Dept: FAMILY MEDICINE CLINIC | Facility: CLINIC | Age: 43
End: 2018-08-12

## 2018-08-12 DIAGNOSIS — G57.92 ILIOINGUINAL NEURALGIA OF LEFT SIDE: Primary | ICD-10-CM

## 2018-08-12 DIAGNOSIS — M54.16 LUMBAR RADICULOPATHY: ICD-10-CM

## 2018-08-13 ENCOUNTER — TELEPHONE (OUTPATIENT)
Dept: PHYSICAL THERAPY | Age: 43
End: 2018-08-13

## 2018-08-13 NOTE — TELEPHONE ENCOUNTER
From: Narcisa Matthews  To: Luciano Joe DO  Sent: 8/12/2018 3:00 PM CDT  Subject: Visit [facility] Carol Ardon,    Can I work with Natalie Keller on the PT. What should I do about the another issues.     Thank you

## 2018-09-06 ENCOUNTER — PATIENT MESSAGE (OUTPATIENT)
Dept: FAMILY MEDICINE CLINIC | Facility: CLINIC | Age: 43
End: 2018-09-06

## 2018-09-07 RX ORDER — AMITRIPTYLINE HYDROCHLORIDE 25 MG/1
25 TABLET, FILM COATED ORAL NIGHTLY
Qty: 30 TABLET | Refills: 2 | Status: SHIPPED | OUTPATIENT
Start: 2018-09-07 | End: 2018-10-04 | Stop reason: ALTCHOICE

## 2018-09-07 NOTE — TELEPHONE ENCOUNTER
From: Fatemeh Nye  To: Guanakito Gamboa, DO  Sent: 9/6/2018 3:28 PM CDT  Subject: Non-Urgent Ana Easton,    My leg pain is coming Back. You mention that you had some medicine for the Pain.     I was out of town and I got some mosquito bite

## 2018-09-12 ENCOUNTER — APPOINTMENT (OUTPATIENT)
Dept: PHYSICAL THERAPY | Age: 43
End: 2018-09-12
Attending: FAMILY MEDICINE
Payer: COMMERCIAL

## 2018-09-20 ENCOUNTER — APPOINTMENT (OUTPATIENT)
Dept: PHYSICAL THERAPY | Age: 43
End: 2018-09-20
Attending: FAMILY MEDICINE
Payer: COMMERCIAL

## 2018-11-15 ENCOUNTER — OFFICE VISIT (OUTPATIENT)
Dept: FAMILY MEDICINE CLINIC | Facility: CLINIC | Age: 43
End: 2018-11-15
Payer: COMMERCIAL

## 2018-11-15 VITALS
OXYGEN SATURATION: 98 % | WEIGHT: 151 LBS | SYSTOLIC BLOOD PRESSURE: 122 MMHG | DIASTOLIC BLOOD PRESSURE: 64 MMHG | HEIGHT: 68 IN | HEART RATE: 105 BPM | BODY MASS INDEX: 22.88 KG/M2

## 2018-11-15 DIAGNOSIS — R30.0 DYSURIA: Primary | ICD-10-CM

## 2018-11-15 DIAGNOSIS — G44.82 HEADACHE ASSOCIATED WITH SEXUAL ACTIVITY: ICD-10-CM

## 2018-11-15 DIAGNOSIS — B37.2 CANDIDAL INTERTRIGO: ICD-10-CM

## 2018-11-15 DIAGNOSIS — R21 RASH AND NONSPECIFIC SKIN ERUPTION: ICD-10-CM

## 2018-11-15 DIAGNOSIS — M54.16 LUMBAR RADICULOPATHY: ICD-10-CM

## 2018-11-15 PROCEDURE — 87186 SC STD MICRODIL/AGAR DIL: CPT | Performed by: FAMILY MEDICINE

## 2018-11-15 PROCEDURE — 87086 URINE CULTURE/COLONY COUNT: CPT | Performed by: FAMILY MEDICINE

## 2018-11-15 PROCEDURE — 99214 OFFICE O/P EST MOD 30 MIN: CPT | Performed by: FAMILY MEDICINE

## 2018-11-15 PROCEDURE — 87077 CULTURE AEROBIC IDENTIFY: CPT | Performed by: FAMILY MEDICINE

## 2018-11-15 RX ORDER — GABAPENTIN 300 MG/1
300 CAPSULE ORAL NIGHTLY
Qty: 30 CAPSULE | Refills: 1 | Status: SHIPPED | OUTPATIENT
Start: 2018-11-15 | End: 2019-04-02

## 2018-11-15 RX ORDER — CLOTRIMAZOLE AND BETAMETHASONE DIPROPIONATE 10; .64 MG/G; MG/G
1 CREAM TOPICAL 2 TIMES DAILY PRN
Qty: 60 G | Refills: 2 | Status: SHIPPED | OUTPATIENT
Start: 2018-11-15 | End: 2019-04-02

## 2018-11-15 NOTE — PATIENT INSTRUCTIONS
-Can take Zyrtec or Benadryl if rash recurs and/or is itchy  -Recommend you see an allergist to determine possible cause if it persists  -Try prescribed cream in the genital/groin areas for red and itchy rash  -Schedule PT and MRI once authorized   Candida · If you are overweight, talk with your healthcare provider about a plan to lose excess weight. · Avoid clothes that fit tightly. Follow-up care  Follow up with your healthcare provider, or as advised. Your rash will clear in 7 to 14 days.  Call your heal In some cases, hives may occur again and again with no specific cause. If you have hives  · Stay away from the food, drink, medicine, or other thing that may be causing the hives. · Ask your healthcare provider how to control itchy or irritated skin.   · © 1340-0682 [employer]. [address] [facility], [facility] [place]. All rights reserved. This information is not intended as a substitute for professional medical care. Always follow your healthcare professional's instructions.

## 2018-11-15 NOTE — PROGRESS NOTES
CC:  Patient presents with:  Headache: started a couple weeks ago- states the headache is on and off   Dysuria  Rash      HPI: 39year old male here for intermittent headache x 2 weeks, dysuria, a rash, and chronic left lateral leg pain.   Has had new onset visual changes with the headache   Cardio:  No chest pain  Pulmonary:  No cough, no SOB  GI:  No N/V/D, no constipation, no diarrhea   :  No discharge, dysuria, penile sores, polyuria, no urgency, no hematuria  Dermatologic:  No rashes  Neuro: new onset lb   Height: 68\"       Body mass index is 22.96 kg/m².     Physical:  General:  Alert, appropriate, no acute distress   HEENT: supple, no tonsillar erythema or exudate, no lymphadenopathy   Cardio:  RRR, no murmurs, S1, S2  Pulmonary:  Clear bilaterally, g without contrast   - MRI BRAIN (CPT=70551); Future    4. Lumbar radiculopathy    - Referral to PT for treatment of chronic lumbar radiculopathy   - PHYSICAL THERAPY - INTERNAL    5.  Rash and nonspecific skin eruption    - Advised identifying trigger, and c

## 2018-11-17 PROBLEM — N39.0 ACUTE UTI (URINARY TRACT INFECTION): Status: ACTIVE | Noted: 2018-11-17

## 2018-11-17 RX ORDER — NITROFURANTOIN 25; 75 MG/1; MG/1
100 CAPSULE ORAL 2 TIMES DAILY
Qty: 10 CAPSULE | Refills: 0 | Status: SHIPPED | OUTPATIENT
Start: 2018-11-17 | End: 2018-11-22

## 2018-11-17 NOTE — PROGRESS NOTES
Please ensure patient got my MyChart message that his urine culture was positive for a UTI and I have sent Nitrofurantoin 100 mg twice daily x 5 days to his pharmacy[facility]. Notify me if his burning with urination does not resolve.

## 2018-11-24 ENCOUNTER — LAB ENCOUNTER (OUTPATIENT)
Dept: LAB | Age: 43
End: 2018-11-24
Attending: FAMILY MEDICINE
Payer: COMMERCIAL

## 2018-11-24 ENCOUNTER — HOSPITAL ENCOUNTER (OUTPATIENT)
Dept: MRI IMAGING | Age: 43
Discharge: HOME | End: 2018-11-24
Attending: FAMILY MEDICINE
Payer: COMMERCIAL

## 2018-11-24 DIAGNOSIS — L29.9 PRURITUS, UNSPECIFIED: ICD-10-CM

## 2018-11-24 DIAGNOSIS — L30.9 DERMATITIS, UNSPECIFIED: Primary | ICD-10-CM

## 2018-11-24 DIAGNOSIS — G44.82 HEADACHE ASSOCIATED WITH SEXUAL ACTIVITY: ICD-10-CM

## 2018-11-24 PROCEDURE — 84439 ASSAY OF FREE THYROXINE: CPT

## 2018-11-24 PROCEDURE — 36415 COLL VENOUS BLD VENIPUNCTURE: CPT

## 2018-11-24 PROCEDURE — 85025 COMPLETE CBC W/AUTO DIFF WBC: CPT

## 2018-11-24 PROCEDURE — 80076 HEPATIC FUNCTION PANEL: CPT

## 2018-11-24 PROCEDURE — 84443 ASSAY THYROID STIM HORMONE: CPT

## 2018-11-24 PROCEDURE — 80048 BASIC METABOLIC PNL TOTAL CA: CPT

## 2018-11-24 PROCEDURE — 70551 MRI BRAIN STEM W/O DYE: CPT | Performed by: FAMILY MEDICINE

## 2019-01-10 ENCOUNTER — LAB ENCOUNTER (OUTPATIENT)
Dept: LAB | Age: 44
End: 2019-01-10
Attending: DERMATOLOGY
Payer: COMMERCIAL

## 2019-01-10 DIAGNOSIS — L29.9 PRURITUS: Primary | ICD-10-CM

## 2019-01-10 LAB
BASOPHILS # BLD: 0 K/UL (ref 0–0.2)
BASOPHILS NFR BLD: 1 %
EOSINOPHIL # BLD: 0.1 K/UL (ref 0–0.7)
EOSINOPHIL NFR BLD: 1 %
ERYTHROCYTE [DISTWIDTH] IN BLOOD BY AUTOMATED COUNT: 13.4 % (ref 11–15)
HCT VFR BLD AUTO: 43.5 % (ref 41–52)
HGB BLD-MCNC: 14.7 G/DL (ref 13.5–17.5)
LYMPHOCYTES # BLD: 1.4 K/UL (ref 1–4)
LYMPHOCYTES NFR BLD: 25 %
MCH RBC QN AUTO: 28.3 PG (ref 27–32)
MCHC RBC AUTO-ENTMCNC: 33.8 G/DL (ref 32–37)
MCV RBC AUTO: 83.9 FL (ref 80–100)
MONOCYTES # BLD: 0.5 K/UL (ref 0–1)
MONOCYTES NFR BLD: 9 %
NEUTROPHILS # BLD AUTO: 3.6 K/UL (ref 1.8–7.7)
NEUTROPHILS NFR BLD: 65 %
PLATELET # BLD AUTO: 239 K/UL (ref 140–400)
PMV BLD AUTO: 8 FL (ref 7.4–10.3)
RBC # BLD AUTO: 5.19 M/UL (ref 4.5–5.9)
WBC # BLD AUTO: 5.5 K/UL (ref 4–11)

## 2019-01-10 PROCEDURE — 36415 COLL VENOUS BLD VENIPUNCTURE: CPT

## 2019-01-10 PROCEDURE — 85025 COMPLETE CBC W/AUTO DIFF WBC: CPT

## 2019-04-02 PROCEDURE — 87077 CULTURE AEROBIC IDENTIFY: CPT | Performed by: UROLOGY

## 2019-04-02 PROCEDURE — 87086 URINE CULTURE/COLONY COUNT: CPT | Performed by: UROLOGY

## 2019-04-22 ENCOUNTER — TELEPHONE (OUTPATIENT)
Dept: FAMILY MEDICINE CLINIC | Facility: CLINIC | Age: 44
End: 2019-04-22

## 2019-04-22 DIAGNOSIS — R30.0 DYSURIA: Primary | ICD-10-CM

## 2019-04-22 PROCEDURE — 81003 URINALYSIS AUTO W/O SCOPE: CPT | Performed by: FAMILY MEDICINE

## 2019-04-22 NOTE — TELEPHONE ENCOUNTER
Pt offered to be seen by Dr. Sarah De La Rosa or a nurse visit for a ua. Pt states he can come in for a nurse visit ua and c & s tomorrow. Pt scheduled for 4pm. Pt verbalized understanding. Order placed.

## 2019-04-22 NOTE — TELEPHONE ENCOUNTER
Pt c/o dysuria, states \"on fire\" while urinating since last week. Pt denies fever or other sx's at this time. Pt states \"sometimes fishy\" smell with urine, yellow/dark yellow in color, states he is hydrating approx 40oz per day.  Denies lower back pain,

## 2019-04-23 ENCOUNTER — NURSE ONLY (OUTPATIENT)
Dept: FAMILY MEDICINE CLINIC | Facility: CLINIC | Age: 44
End: 2019-04-23
Payer: COMMERCIAL

## 2019-04-23 DIAGNOSIS — R30.0 DYSURIA: ICD-10-CM

## 2019-04-23 PROCEDURE — 87086 URINE CULTURE/COLONY COUNT: CPT | Performed by: FAMILY MEDICINE

## 2019-05-01 ENCOUNTER — PATIENT MESSAGE (OUTPATIENT)
Dept: FAMILY MEDICINE CLINIC | Facility: CLINIC | Age: 44
End: 2019-05-01

## 2019-05-01 NOTE — TELEPHONE ENCOUNTER
Spoke with pt who has had frequent urination with burning and penile irritation x 2 weeks. Offered pt SDA appmnt this week but pt declined r/t appmnt time. Pt scheduled for 5/6/19 at 15:00 (SDA), no openings till 5/9/19.

## 2019-05-06 ENCOUNTER — OFFICE VISIT (OUTPATIENT)
Dept: FAMILY MEDICINE CLINIC | Facility: CLINIC | Age: 44
End: 2019-05-06
Payer: COMMERCIAL

## 2019-05-06 ENCOUNTER — LAB ENCOUNTER (OUTPATIENT)
Dept: LAB | Facility: REFERENCE LAB | Age: 44
End: 2019-05-06
Attending: FAMILY MEDICINE
Payer: COMMERCIAL

## 2019-05-06 VITALS
BODY MASS INDEX: 23.34 KG/M2 | DIASTOLIC BLOOD PRESSURE: 70 MMHG | SYSTOLIC BLOOD PRESSURE: 124 MMHG | WEIGHT: 154 LBS | OXYGEN SATURATION: 100 % | HEIGHT: 68 IN | HEART RATE: 104 BPM

## 2019-05-06 DIAGNOSIS — K62.89 RECTAL OR ANAL PAIN: ICD-10-CM

## 2019-05-06 DIAGNOSIS — R30.0 DYSURIA: Primary | ICD-10-CM

## 2019-05-06 PROBLEM — N39.0 ACUTE UTI (URINARY TRACT INFECTION): Status: RESOLVED | Noted: 2018-11-17 | Resolved: 2019-05-06

## 2019-05-06 PROCEDURE — 86141 C-REACTIVE PROTEIN HS: CPT

## 2019-05-06 PROCEDURE — 87491 CHLMYD TRACH DNA AMP PROBE: CPT | Performed by: FAMILY MEDICINE

## 2019-05-06 PROCEDURE — 87591 N.GONORRHOEAE DNA AMP PROB: CPT | Performed by: FAMILY MEDICINE

## 2019-05-06 PROCEDURE — 36415 COLL VENOUS BLD VENIPUNCTURE: CPT

## 2019-05-06 PROCEDURE — 87086 URINE CULTURE/COLONY COUNT: CPT | Performed by: FAMILY MEDICINE

## 2019-05-06 PROCEDURE — 85025 COMPLETE CBC W/AUTO DIFF WBC: CPT

## 2019-05-06 PROCEDURE — 99214 OFFICE O/P EST MOD 30 MIN: CPT | Performed by: FAMILY MEDICINE

## 2019-05-06 RX ORDER — SULFAMETHOXAZOLE AND TRIMETHOPRIM 800; 160 MG/1; MG/1
1 TABLET ORAL 2 TIMES DAILY
Qty: 20 TABLET | Refills: 0 | Status: SHIPPED | OUTPATIENT
Start: 2019-05-06 | End: 2019-05-16

## 2019-05-06 NOTE — PATIENT INSTRUCTIONS
Prostatitis    The prostate gland is located deep inside the body at the base of the bladder. Prostatitis is an inflammation of the prostate gland. This can occur with or without infection. Most cases of prostatitis are long term (chronic).  Most do not i · Constipation causes straining and pain. Avoid constipation by eating natural laxatives such as prunes, fresh fruits, and whole-grain cereals. If needed, use a mild over-the-counter (OTC) laxative for constipation.  An OTC stool softener may be used to [employer]

## 2019-05-06 NOTE — PROGRESS NOTES
CC:  Patient presents with:  Burning On Urination: states he is still having pain/burning when he urinates, states its getting worse      HPI: 38year old male here to discuss worsening dysuria.   Developed dysuria 2-3 weeks ago and it has gotten worse sinc file        Non-medical: Not on file    Tobacco Use      Smoking status: Never Smoker      Smokeless tobacco: Never Used    Substance and Sexual Activity      Alcohol use:  Yes        Alcohol/week: 3.6 oz        Types: 6 Cans of beer per week        Comment ligament pain. Stool at rectal vault with no blood noted. No anal fissure and no significant hemorrhoids but small perianal skin tag. Prostate firm and not boggy with no masses, mild tenderness to palpation.    Dermatologic:  No rashes or lesions    Recent

## 2019-05-29 PROCEDURE — 81003 URINALYSIS AUTO W/O SCOPE: CPT | Performed by: UROLOGY

## 2019-06-28 PROCEDURE — 87077 CULTURE AEROBIC IDENTIFY: CPT | Performed by: PHYSICIAN ASSISTANT

## 2019-06-28 PROCEDURE — 87086 URINE CULTURE/COLONY COUNT: CPT | Performed by: PHYSICIAN ASSISTANT

## 2019-09-03 ENCOUNTER — HOSPITAL ENCOUNTER (OUTPATIENT)
Age: 44
Discharge: HOME | End: 2019-09-03
Attending: EMERGENCY MEDICINE
Payer: COMMERCIAL

## 2019-09-03 VITALS
BODY MASS INDEX: 22.73 KG/M2 | HEIGHT: 68 IN | RESPIRATION RATE: 18 BRPM | TEMPERATURE: 98 F | HEART RATE: 89 BPM | WEIGHT: 150 LBS | OXYGEN SATURATION: 99 % | DIASTOLIC BLOOD PRESSURE: 85 MMHG | SYSTOLIC BLOOD PRESSURE: 118 MMHG

## 2019-09-03 DIAGNOSIS — R35.0 URINARY FREQUENCY: ICD-10-CM

## 2019-09-03 DIAGNOSIS — J01.10 ACUTE NON-RECURRENT FRONTAL SINUSITIS: Primary | ICD-10-CM

## 2019-09-03 LAB
BILIRUB UR QL STRIP: NEGATIVE
CLARITY UR: CLEAR
COLOR UR: YELLOW
GLUCOSE UR STRIP-MCNC: NEGATIVE MG/DL
HGB UR QL STRIP: NEGATIVE
KETONES UR STRIP-MCNC: NEGATIVE MG/DL
LEUKOCYTE ESTERASE UR QL STRIP: NEGATIVE
NITRITE UR QL STRIP: NEGATIVE
PH UR STRIP: 7 [PH]
PROT UR STRIP-MCNC: NEGATIVE MG/DL
SP GR UR STRIP: 1.01
UROBILINOGEN UR STRIP-ACNC: <2 MG/DL

## 2019-09-03 PROCEDURE — 99212 OFFICE O/P EST SF 10 MIN: CPT

## 2019-09-03 PROCEDURE — 81002 URINALYSIS NONAUTO W/O SCOPE: CPT

## 2019-09-03 PROCEDURE — 99213 OFFICE O/P EST LOW 20 MIN: CPT

## 2019-09-03 RX ORDER — FLUTICASONE PROPIONATE 50 MCG
2 SPRAY, SUSPENSION (ML) NASAL DAILY
Qty: 16 G | Refills: 0 | Status: SHIPPED | OUTPATIENT
Start: 2019-09-03 | End: 2019-10-03

## 2019-09-04 NOTE — ED PROVIDER NOTES
Patient Seen in: [address]    History   Patient presents with:  Sinus Problem    Stated Complaint: SINUS ISSUES    HPI    Four days of sinus pressure and headache. Has been taking over the counter meds without relief.  Marianne Dunaway rate, regular rhythm and intact distal pulses. Pulmonary/Chest: Effort normal. No respiratory distress. Neurological: He is alert. No sensory deficit. He exhibits normal muscle tone. Skin: Skin is warm and dry.  Capillary refill takes less than 2 seco

## 2019-09-19 PROCEDURE — 87086 URINE CULTURE/COLONY COUNT: CPT | Performed by: OBSTETRICS & GYNECOLOGY

## 2019-09-23 ENCOUNTER — PATIENT MESSAGE (OUTPATIENT)
Dept: FAMILY MEDICINE CLINIC | Facility: CLINIC | Age: 44
End: 2019-09-23

## 2019-09-25 ENCOUNTER — OFFICE VISIT (OUTPATIENT)
Dept: FAMILY MEDICINE CLINIC | Facility: CLINIC | Age: 44
End: 2019-09-25
Payer: COMMERCIAL

## 2019-09-25 VITALS
HEIGHT: 68 IN | BODY MASS INDEX: 23.19 KG/M2 | SYSTOLIC BLOOD PRESSURE: 118 MMHG | WEIGHT: 153 LBS | HEART RATE: 60 BPM | DIASTOLIC BLOOD PRESSURE: 66 MMHG | OXYGEN SATURATION: 98 %

## 2019-09-25 DIAGNOSIS — R30.0 DYSURIA: ICD-10-CM

## 2019-09-25 DIAGNOSIS — K64.4 EXTERNAL HEMORRHOIDS: Primary | ICD-10-CM

## 2019-09-25 PROCEDURE — 99213 OFFICE O/P EST LOW 20 MIN: CPT | Performed by: FAMILY MEDICINE

## 2019-09-25 NOTE — PROGRESS NOTES
CC:  Patient presents with:  UTI: started having symtpoms for about a month and was seent at the immediate care was given antibiotics- still feels the same      HPI: 38year old male here to discuss now chronic dysuria and rectal burning.   Was seen at [facility] Smokeless tobacco: Never Used    Substance and Sexual Activity      Alcohol use:  Yes        Alcohol/week: 6.0 standard drinks        Types: 6 Cans of beer per week        Comment: social      Drug use: No      Sexual activity: Yes        Partners: Female kg/m².     Physical:  General:  Alert, appropriate, no acute distress   GI:  Soft, positive bowel sounds, non-tender, no masses, no guarding, no rebound  : External hemorrhoids (non-bleeding), no penile masses or lesions   Dermatologic:  No rashes or lesi

## 2019-09-25 NOTE — PATIENT INSTRUCTIONS
-Try using Preparation H wipes when you wipe for the hemorrhoids and continue Anusol cream twice a day  -Increase your water intake to 75-80 ounces a day and avoid straining to urinate or have a bowel movement if possible  -If urinary symptoms do not impro absorbs water as it moves through your colon. This makes stool softer and easier to pass. ? Increase the fiber in your diet with more fiber-rich foods. These include fresh fruit, vegetables, and whole grains.   ? Take a fiber supplement or bulking agent, i

## 2020-04-08 ENCOUNTER — TELEPHONE (OUTPATIENT)
Dept: PHYSICAL THERAPY | Age: 45
End: 2020-04-08

## 2020-04-16 ENCOUNTER — APPOINTMENT (OUTPATIENT)
Dept: PHYSICAL THERAPY | Age: 45
End: 2020-04-16
Payer: COMMERCIAL

## 2020-04-29 ENCOUNTER — APPOINTMENT (OUTPATIENT)
Dept: PHYSICAL THERAPY | Age: 45
End: 2020-04-29
Payer: COMMERCIAL

## 2020-05-04 ENCOUNTER — OFFICE VISIT (OUTPATIENT)
Dept: PHYSICAL THERAPY | Age: 45
End: 2020-05-04
Attending: FAMILY MEDICINE
Payer: COMMERCIAL

## 2020-05-04 DIAGNOSIS — R30.0 DYSURIA: ICD-10-CM

## 2020-05-04 DIAGNOSIS — R10.2 MALE PELVIC PAIN: ICD-10-CM

## 2020-05-04 DIAGNOSIS — M62.89 PELVIC FLOOR TENSION: ICD-10-CM

## 2020-05-04 DIAGNOSIS — N41.1 CHRONIC PROSTATITIS: ICD-10-CM

## 2020-05-04 PROCEDURE — 97112 NEUROMUSCULAR REEDUCATION: CPT

## 2020-05-04 PROCEDURE — 97535 SELF CARE MNGMENT TRAINING: CPT

## 2020-05-04 PROCEDURE — 97162 PT EVAL MOD COMPLEX 30 MIN: CPT

## 2020-05-04 NOTE — PROGRESS NOTES
PELVIC FLOOR EVALUATION:   Referring Physician: Dr. Vilma Easley  Diagnosis: Dysuria (R30.0)  Male pelvic pain (R10.2)  Pelvic floor tension (N34.3)  Chronic prostatitis (N41.1)  Date of onset: chronic Date of Service: 5/4/2020     PATIENT SUMMARY   Wolf Merlos Intake: water: 60 oz,   Bladder irritants: none  Urine Stop test: yes  Post void dribble: no  Empty bladder just in case: no  Do you ever leak urine without knowing it? no    BOWEL HABITS  Types of symptoms: none   Frequency of bowel movements: 1x day  Sto pouch:TTP    Internal Palpation Left Right Comments   Superficial Transverse perineal moderate restriction and pain moderate restriction and pain    Bulbocavernosus moderate restriction and pain moderate restriction and pain    Ischiocavernosus moderate re to allow for decreased pain when walking  Patient will report pain of 0/10 with erection  Patient will report pain of 0/10 with urination  Patient understands importance of performing HEP to prevent reoccurrence of symptoms.     Frequency / Duration: Patien

## 2020-05-06 ENCOUNTER — APPOINTMENT (OUTPATIENT)
Dept: PHYSICAL THERAPY | Age: 45
End: 2020-05-06
Payer: COMMERCIAL

## 2020-05-11 ENCOUNTER — OFFICE VISIT (OUTPATIENT)
Dept: PHYSICAL THERAPY | Age: 45
End: 2020-05-11
Attending: FAMILY MEDICINE
Payer: COMMERCIAL

## 2020-05-11 PROCEDURE — 97110 THERAPEUTIC EXERCISES: CPT

## 2020-05-11 PROCEDURE — 97140 MANUAL THERAPY 1/> REGIONS: CPT

## 2020-05-11 NOTE — PROGRESS NOTES
Dx: Dysuria (R30.0); Male pelvic pain (R10.2); Pelvic floor tension (N34.3);  Chronic prostatitis (N41.1)     Insurance (Authorized # of Visits):  [employer] PPO 10 per POC          Authorizing Physician: Dr. Philly Olsen Next MD visit: none scheduled  Fall Risk: stand

## 2020-05-12 ENCOUNTER — TELEPHONE (OUTPATIENT)
Dept: PHYSICAL THERAPY | Facility: HOSPITAL | Age: 45
End: 2020-05-12

## 2020-05-13 ENCOUNTER — APPOINTMENT (OUTPATIENT)
Dept: PHYSICAL THERAPY | Age: 45
End: 2020-05-13
Payer: COMMERCIAL

## 2020-05-13 ENCOUNTER — APPOINTMENT (OUTPATIENT)
Dept: PHYSICAL THERAPY | Age: 45
End: 2020-05-13
Attending: OBSTETRICS & GYNECOLOGY
Payer: COMMERCIAL

## 2020-05-20 ENCOUNTER — OFFICE VISIT (OUTPATIENT)
Dept: PHYSICAL THERAPY | Age: 45
End: 2020-05-20
Attending: OBSTETRICS & GYNECOLOGY
Payer: COMMERCIAL

## 2020-05-20 PROCEDURE — 97140 MANUAL THERAPY 1/> REGIONS: CPT

## 2020-05-20 PROCEDURE — 97110 THERAPEUTIC EXERCISES: CPT

## 2020-05-20 NOTE — PROGRESS NOTES
Dx: Dysuria (R30.0); Male pelvic pain (R10.2); Pelvic floor tension (N34.3);  Chronic prostatitis (N41.1)     Insurance (Authorized # of Visits):  BCBS PPO 10 per POC          Authorizing Physician: Dr. Loyda Mcdaniel Next MD visit: none scheduled  Fall Risk: stand

## 2020-05-27 ENCOUNTER — OFFICE VISIT (OUTPATIENT)
Dept: PHYSICAL THERAPY | Age: 45
End: 2020-05-27
Attending: OBSTETRICS & GYNECOLOGY
Payer: COMMERCIAL

## 2020-05-27 PROCEDURE — 97140 MANUAL THERAPY 1/> REGIONS: CPT

## 2020-05-27 PROCEDURE — 97110 THERAPEUTIC EXERCISES: CPT

## 2020-05-27 NOTE — PROGRESS NOTES
Dx: Dysuria (R30.0); Male pelvic pain (R10.2); Pelvic floor tension (N34.3);  Chronic prostatitis (N41.1)     Insurance (Authorized # of Visits):  BCBS PPO 10 per POC          Authorizing Physician: Dr. Dawn Latham Next MD visit: none scheduled  Fall Risk: stand

## 2020-06-03 ENCOUNTER — APPOINTMENT (OUTPATIENT)
Dept: PHYSICAL THERAPY | Age: 45
End: 2020-06-03
Attending: OBSTETRICS & GYNECOLOGY
Payer: COMMERCIAL

## 2020-06-04 ENCOUNTER — OFFICE VISIT (OUTPATIENT)
Dept: PHYSICAL THERAPY | Age: 45
End: 2020-06-04
Attending: OBSTETRICS & GYNECOLOGY
Payer: COMMERCIAL

## 2020-06-04 PROCEDURE — 97140 MANUAL THERAPY 1/> REGIONS: CPT

## 2020-06-04 PROCEDURE — 97110 THERAPEUTIC EXERCISES: CPT

## 2020-06-04 NOTE — PROGRESS NOTES
Dx: Dysuria (R30.0); Male pelvic pain (R10.2); Pelvic floor tension (N34.3);  Chronic prostatitis (N41.1)     Insurance (Authorized # of Visits):  BCBS PPO 10 per POC          Authorizing Physician: Dr. Navneet Ag Next MD visit: none scheduled  Fall Risk: stand to B adductor Manual:  DTM to B adductor Manual:  DTM to B adductor    NeuroMuscular Estefany  Diaphragmatic breathing 20x       Self care Home management:       HEP:butterfly    Charges: 1MAN, 2TE    Total Timed Treatment: 40 min  Total Treatment Time: 45 min

## 2020-06-08 ENCOUNTER — OFFICE VISIT (OUTPATIENT)
Dept: PHYSICAL THERAPY | Age: 45
End: 2020-06-08
Attending: FAMILY MEDICINE
Payer: COMMERCIAL

## 2020-06-08 PROCEDURE — 97110 THERAPEUTIC EXERCISES: CPT

## 2020-06-08 PROCEDURE — 97140 MANUAL THERAPY 1/> REGIONS: CPT

## 2020-06-08 NOTE — PROGRESS NOTES
Dx: Dysuria (R30.0); Male pelvic pain (R10.2); Pelvic floor tension (N34.3);  Chronic prostatitis (N41.1)     Insurance (Authorized # of Visits):  BCBS PPO 10 per POC          Authorizing Physician: Dr. Horacio Bender Next MD visit: none scheduled  Fall Risk: stand for PF relaxation  Clams 20x    Manual:  DTM to B adductor Manual:  DTM to B adductor Manual:  DTM to B adductor             HEP:butterfly    Charges: 1MAN, 2TE    Total Timed Treatment: 40 min  Total Treatment Time: 45 min

## 2020-06-10 ENCOUNTER — APPOINTMENT (OUTPATIENT)
Dept: PHYSICAL THERAPY | Age: 45
End: 2020-06-10
Attending: OBSTETRICS & GYNECOLOGY
Payer: COMMERCIAL

## 2020-06-17 ENCOUNTER — OFFICE VISIT (OUTPATIENT)
Dept: PHYSICAL THERAPY | Age: 45
End: 2020-06-17
Attending: OBSTETRICS & GYNECOLOGY
Payer: COMMERCIAL

## 2020-06-17 PROCEDURE — 97140 MANUAL THERAPY 1/> REGIONS: CPT

## 2020-06-17 PROCEDURE — 97110 THERAPEUTIC EXERCISES: CPT

## 2020-06-17 NOTE — PROGRESS NOTES
Dx: Dysuria (R30.0); Male pelvic pain (R10.2); Pelvic floor tension (N34.3);  Chronic prostatitis (N41.1)     Insurance (Authorized # of Visits):  BCBS PPO 10 per POC          Authorizing Physician: Dr. Horacio Bender Next MD visit: none scheduled  Fall Risk: stand 45 min

## 2020-07-01 ENCOUNTER — OFFICE VISIT (OUTPATIENT)
Dept: PHYSICAL THERAPY | Age: 45
End: 2020-07-01
Attending: OBSTETRICS & GYNECOLOGY
Payer: COMMERCIAL

## 2020-07-01 PROCEDURE — 97110 THERAPEUTIC EXERCISES: CPT

## 2020-07-01 PROCEDURE — 97140 MANUAL THERAPY 1/> REGIONS: CPT

## 2020-07-01 NOTE — PROGRESS NOTES
Dx: Dysuria (R30.0); Male pelvic pain (R10.2); Pelvic floor tension (N34.3);  Chronic prostatitis (N41.1)     Insurance (Authorized # of Visits):  BCBS PPO 10 per POC          Authorizing Physician: Dr. Loyda Mcdaniel Next MD visit: none scheduled  Fall Risk: stand sec  Prone hip IR/ER AROM 20x   LTR on ball 20x  DKTC with ball 20x  Seated SB rotations 20x for PF relaxation  Clams 20x   OI stretch 20x   Manual:  DTM to B adductor Manual:  DTM to B adductor Manual:  DTM to B adductor Manual:  DTM to B adductor

## 2020-07-08 ENCOUNTER — APPOINTMENT (OUTPATIENT)
Dept: PHYSICAL THERAPY | Age: 45
End: 2020-07-08
Attending: OBSTETRICS & GYNECOLOGY
Payer: COMMERCIAL

## 2020-07-11 ENCOUNTER — PATIENT MESSAGE (OUTPATIENT)
Dept: FAMILY MEDICINE CLINIC | Facility: CLINIC | Age: 45
End: 2020-07-11

## 2020-07-13 ENCOUNTER — TELEPHONE (OUTPATIENT)
Dept: FAMILY MEDICINE CLINIC | Facility: CLINIC | Age: 45
End: 2020-07-13

## 2020-07-13 ENCOUNTER — OFFICE VISIT (OUTPATIENT)
Dept: FAMILY MEDICINE CLINIC | Facility: CLINIC | Age: 45
End: 2020-07-13
Payer: COMMERCIAL

## 2020-07-13 VITALS
BODY MASS INDEX: 22.58 KG/M2 | TEMPERATURE: 99 F | WEIGHT: 149 LBS | DIASTOLIC BLOOD PRESSURE: 60 MMHG | HEIGHT: 68 IN | OXYGEN SATURATION: 98 % | HEART RATE: 78 BPM | SYSTOLIC BLOOD PRESSURE: 118 MMHG

## 2020-07-13 DIAGNOSIS — K52.9 GASTROENTERITIS: Primary | ICD-10-CM

## 2020-07-13 DIAGNOSIS — R82.998 DARK URINE: ICD-10-CM

## 2020-07-13 LAB
APPEARANCE: CLEAR
BILIRUBIN: NEGATIVE
GLUCOSE (URINE DIPSTICK): NEGATIVE MG/DL
LEUKOCYTES: NEGATIVE
MULTISTIX LOT#: NORMAL NUMERIC
NITRITE, URINE: NEGATIVE
PH, URINE: 5.5 (ref 4.5–8)
PROTEIN (URINE DIPSTICK): NEGATIVE MG/DL
SPECIFIC GRAVITY: 1.03 (ref 1–1.03)
UROBILINOGEN,SEMI-QN: 0.2 MG/DL (ref 0–1.9)

## 2020-07-13 PROCEDURE — 99213 OFFICE O/P EST LOW 20 MIN: CPT | Performed by: FAMILY MEDICINE

## 2020-07-13 PROCEDURE — 81002 URINALYSIS NONAUTO W/O SCOPE: CPT | Performed by: FAMILY MEDICINE

## 2020-07-13 NOTE — TELEPHONE ENCOUNTER
Pt called see encounter. From: Onofre English  To: Leonel Aldana DO  Sent: 7/11/2020  9:08 PM CDT  Subject: Other    Hi Dr Rae Emanuel,    I  going to [facility] a lot. My urine is dark and they said it is because I am not drinking enough water.     I have t

## 2020-07-13 NOTE — TELEPHONE ENCOUNTER
Patient not sure if it is the heat or the food but everytime he goes to bathroom his urine is dark yellow and has a bowel movement after he eats.

## 2020-07-13 NOTE — TELEPHONE ENCOUNTER
Called pt and c/o BM frequent, brown and black BM and urine is dark drinks 40 ounces. Denies tarry stool. Provided appointment with Dr. Evelyn Salcedo for today. Pt verbalized understanding.       Last visit 09/25/20 AS

## 2020-07-22 ENCOUNTER — OFFICE VISIT (OUTPATIENT)
Dept: PHYSICAL THERAPY | Age: 45
End: 2020-07-22
Attending: OBSTETRICS & GYNECOLOGY
Payer: COMMERCIAL

## 2020-07-22 PROCEDURE — 97140 MANUAL THERAPY 1/> REGIONS: CPT

## 2020-07-22 PROCEDURE — 97110 THERAPEUTIC EXERCISES: CPT

## 2020-07-22 NOTE — PROGRESS NOTES
Dx: Dysuria (R30.0); Male pelvic pain (R10.2); Pelvic floor tension (N34.3);  Chronic prostatitis (N41.1)     Insurance (Authorized # of Visits):  BCBS PPO 10 per POC          Authorizing Physician: Dr. Horacio Bender Next MD visit: none scheduled  Fall Risk: stand HEP:OI stretch    Charges: 1MAN, 2TE    Total Timed Treatment: 40 min  Total Treatment Time: 45 min

## 2020-07-29 ENCOUNTER — OFFICE VISIT (OUTPATIENT)
Dept: PHYSICAL THERAPY | Age: 45
End: 2020-07-29
Attending: OBSTETRICS & GYNECOLOGY
Payer: COMMERCIAL

## 2020-07-29 PROCEDURE — 97110 THERAPEUTIC EXERCISES: CPT

## 2020-07-29 PROCEDURE — 97140 MANUAL THERAPY 1/> REGIONS: CPT

## 2020-07-29 NOTE — PROGRESS NOTES
Litzy  Pt has attended 10 visits in Physical Therapy. Dx: Dysuria (R30.0); Male pelvic pain (R10.2); Pelvic floor tension (N34.3);  Chronic prostatitis (N41.1)     Insurance (Authorized # of Visits):  BCBS PPO 10 per POC          Author ball 20x  DKTC with ball 20x  Seated SB rotations 20x for PF relaxation  Clams 20x   Reverse clams 20x  OI stretch 20x   Manual:  DTM to B adductor Manual:  DTM to B adductor Manual:  DTM to B adductor             HEP:full review of HEP  FOTO: NOT CAPTURED

## 2020-11-12 ENCOUNTER — TELEMEDICINE (OUTPATIENT)
Dept: FAMILY MEDICINE CLINIC | Facility: CLINIC | Age: 45
End: 2020-11-12
Payer: COMMERCIAL

## 2020-11-12 DIAGNOSIS — J30.2 SEASONAL ALLERGIC RHINITIS, UNSPECIFIED TRIGGER: ICD-10-CM

## 2020-11-12 DIAGNOSIS — J30.9 ALLERGIC SINUSITIS: Primary | ICD-10-CM

## 2020-11-12 PROCEDURE — 99213 OFFICE O/P EST LOW 20 MIN: CPT | Performed by: FAMILY MEDICINE

## 2020-11-12 NOTE — PROGRESS NOTES
HPI:    Patient ID: Avelino Dodge is a 54year old male who presents for sinus issues. HPI  Has sinus issues. Sx include watery eyes, sinus headache, runny nose. No congestion or cough. Sx off and on for past month.    Sx are worse in morning and even N/V/D resolved. No current GI symptoms. Skin: Negative. Allergic/Immunologic: Positive for environmental allergies. Neurological: Positive for headaches. All other systems reviewed and are negative.           There were no vitals taken for t of care above.        Kevin Rodríguez DO  MR#[id number]

## 2020-12-13 ENCOUNTER — PATIENT MESSAGE (OUTPATIENT)
Dept: FAMILY MEDICINE CLINIC | Facility: CLINIC | Age: 45
End: 2020-12-13

## 2020-12-14 PROBLEM — S76.019A STRAIN OF HIP ADDUCTOR MUSCLE: Status: ACTIVE | Noted: 2020-11-09

## 2020-12-14 PROBLEM — M50.30 DEGENERATIVE DISC DISEASE, CERVICAL: Status: ACTIVE | Noted: 2019-12-04

## 2020-12-14 PROBLEM — R10.2: Status: ACTIVE | Noted: 2020-11-09

## 2020-12-14 PROBLEM — N50.89: Status: ACTIVE | Noted: 2020-11-09

## 2020-12-14 RX ORDER — DIAZEPAM 10 MG/1
5 TABLET ORAL DAILY PRN
Refills: 0 | COMMUNITY
Start: 2020-12-14 | End: 2021-03-04

## 2020-12-14 NOTE — TELEPHONE ENCOUNTER
Pt sent message on 12/12/20 to Dr Jennifer Guzman and response was provided. From: Rc Blas  To: Monisha Saucedo DO  Sent: 12/13/2020  9:00 AM CST  Subject: Kiersten Mejia Dr.    I had the flu Shot last week.   I am taking Flonase for the sinu

## 2020-12-21 ENCOUNTER — HOSPITAL ENCOUNTER (OUTPATIENT)
Age: 45
Discharge: HOME | End: 2020-12-21
Attending: EMERGENCY MEDICINE
Payer: COMMERCIAL

## 2020-12-21 VITALS
OXYGEN SATURATION: 98 % | SYSTOLIC BLOOD PRESSURE: 112 MMHG | WEIGHT: 150 LBS | BODY MASS INDEX: 23 KG/M2 | DIASTOLIC BLOOD PRESSURE: 77 MMHG | TEMPERATURE: 98 F | RESPIRATION RATE: 17 BRPM | HEART RATE: 106 BPM

## 2020-12-21 DIAGNOSIS — Z20.822 ENCOUNTER FOR SCREENING LABORATORY TESTING FOR COVID-19 VIRUS: Primary | ICD-10-CM

## 2020-12-21 PROCEDURE — 99202 OFFICE O/P NEW SF 15 MIN: CPT | Performed by: EMERGENCY MEDICINE

## 2020-12-21 RX ORDER — FLUTICASONE PROPIONATE 50 MCG
SPRAY, SUSPENSION (ML) NASAL DAILY
COMMUNITY
End: 2021-03-04

## 2020-12-21 NOTE — ED INITIAL ASSESSMENT (HPI)
Pt c/o cough and runny nose x3-4 days. No fever. No covid exposure. States no relief with flonase. Awake/alert. Breathing easy and even without distress. Speech clear. Skin warm, dry and pink.

## 2020-12-21 NOTE — ED PROVIDER NOTES
Patient Seen in: [facility]      History   Patient presents with:  Cough/URI    Stated Complaint: cough,runny nose    HPI    Patient is a healthy 50-year-old male who presents to immediate care with cough congestion.   Appetite has  been stabl are as noted in HPI. Constitutional and vital signs reviewed. All other systems reviewed and negative except as noted above.     Physical Exam     ED Triage Vitals [12/21/20 0911]   /77   Pulse 106   Resp 17   Temp 97.8 °F (36.6 °C)   Temp src T 13474  [phone]    In 3 days  If symptoms worsen          Medications Prescribed:  Current Discharge Medication List

## 2020-12-24 ENCOUNTER — TELEMEDICINE (OUTPATIENT)
Dept: FAMILY MEDICINE CLINIC | Facility: CLINIC | Age: 45
End: 2020-12-24
Payer: COMMERCIAL

## 2020-12-24 VITALS — TEMPERATURE: 97 F

## 2020-12-24 DIAGNOSIS — U07.1 COVID-19 VIRUS INFECTION: Primary | ICD-10-CM

## 2020-12-24 DIAGNOSIS — J30.9 ALLERGIC RHINITIS, UNSPECIFIED SEASONALITY, UNSPECIFIED TRIGGER: ICD-10-CM

## 2020-12-24 PROCEDURE — 99213 OFFICE O/P EST LOW 20 MIN: CPT | Performed by: FAMILY MEDICINE

## 2020-12-24 RX ORDER — BENZONATATE 200 MG/1
200 CAPSULE ORAL 3 TIMES DAILY PRN
Qty: 20 CAPSULE | Refills: 0 | Status: SHIPPED | OUTPATIENT
Start: 2020-12-24 | End: 2021-03-04

## 2020-12-24 RX ORDER — CETIRIZINE HYDROCHLORIDE, PSEUDOEPHEDRINE HYDROCHLORIDE 5; 120 MG/1; MG/1
1 TABLET, FILM COATED, EXTENDED RELEASE ORAL 2 TIMES DAILY PRN
Qty: 14 TABLET | Refills: 0 | Status: SHIPPED | OUTPATIENT
Start: 2020-12-24 | End: 2021-03-04

## 2020-12-24 NOTE — PROGRESS NOTES
No chief complaint on file. HPI:   Panda Maddox is a 54year old male who presents for covid-19. Sinuses and cough are bothering him. Sinus issues have been on and off for a while. Symptoms seemed to change on 12/19/20, mostly b/c of bad cough. • Heart Disorder Father         CABG   • Hypertension Father    • Cancer Father         Lung   • Heart Disorder Mother    • Hypertension Mother    • Heart Disease Maternal Grandmother    • Heart Disease Maternal Grandfather    • Stroke Paternal Dinah Kras that the following visit was completed using two-way, real-time interactive audio and video communication.   This has been done in good avis to provide continuity of care in the best interest of the provider-patient relationship, due to the ongoing public

## 2020-12-28 ENCOUNTER — TELEMEDICINE (OUTPATIENT)
Dept: FAMILY MEDICINE CLINIC | Facility: CLINIC | Age: 45
End: 2020-12-28
Payer: COMMERCIAL

## 2020-12-28 DIAGNOSIS — J30.9 ALLERGIC RHINITIS, UNSPECIFIED SEASONALITY, UNSPECIFIED TRIGGER: ICD-10-CM

## 2020-12-28 DIAGNOSIS — U07.1 COVID-19 VIRUS INFECTION: Primary | ICD-10-CM

## 2020-12-28 PROCEDURE — 99213 OFFICE O/P EST LOW 20 MIN: CPT | Performed by: FAMILY MEDICINE

## 2020-12-28 RX ORDER — PERMETHRIN 50 MG/G
CREAM TOPICAL
COMMUNITY
Start: 2020-11-23 | End: 2021-03-04

## 2020-12-28 NOTE — PROGRESS NOTES
Patient presents with:  Covid      HPI:   Mednoza Levin is a 54year old male who presents for covid-19. Doing better overall. Having random sinus headaches for a couple hours at a time. Used vicks and resolves. Cough and congestion improving.    Soraida Mackey T2-T12 for thoracolumbar scoliosis       Family History   Problem Relation Age of Onset   • Diabetes Father    • Heart Disorder Father         CABG   • Hypertension Father    • Cancer Father         Lung   • Heart Disorder Mother    • Hypertension Mother video visit in 2 days.   -Reviewed ED precautions. No orders of the defined types were placed in this encounter. Please note that the following visit was completed using two-way, real-time interactive audio and video communication.   This has been

## 2020-12-30 ENCOUNTER — TELEMEDICINE (OUTPATIENT)
Dept: FAMILY MEDICINE CLINIC | Facility: CLINIC | Age: 45
End: 2020-12-30
Payer: COMMERCIAL

## 2020-12-30 DIAGNOSIS — U07.1 COVID-19 VIRUS INFECTION: Primary | ICD-10-CM

## 2020-12-30 DIAGNOSIS — J30.9 ALLERGIC RHINITIS, UNSPECIFIED SEASONALITY, UNSPECIFIED TRIGGER: ICD-10-CM

## 2020-12-30 PROCEDURE — 99213 OFFICE O/P EST LOW 20 MIN: CPT | Performed by: FAMILY MEDICINE

## 2020-12-30 NOTE — PROGRESS NOTES
Patient presents with:  Covid: + follow up      HPI:   Charmian Hodgkin is a 54year old male who presents for covid-19. Doing well. Continues to improve. Feels a lot better. Congestion has resolved. No sinus pressure as of yesterday.   Feels 100% back 2004    T2-T12 for thoracolumbar scoliosis       Family History   Problem Relation Age of Onset   • Diabetes Father    • Heart Disorder Father         CABG   • Hypertension Father    • Cancer Father         Lung   • Heart Disorder Mother    • Hypertension encounter. Please note that the following visit was completed using two-way, real-time interactive audio and video communication.   This has been done in good avis to provide continuity of care in the best interest of the provider-patient relationship

## 2021-02-24 ENCOUNTER — PATIENT MESSAGE (OUTPATIENT)
Dept: FAMILY MEDICINE CLINIC | Facility: CLINIC | Age: 46
End: 2021-02-24

## 2021-02-24 NOTE — TELEPHONE ENCOUNTER
From: Alexandra Omalley  To:  Jammie Khalil DO  Sent: 2/24/2021 9:31 AM CST  Subject: Other    Hi     I have called your office couple of different time & can not human to answer I would like to make Appointment to See Dr Xiomara Quinones I would like to see her on This Frid

## 2021-03-04 ENCOUNTER — OFFICE VISIT (OUTPATIENT)
Dept: FAMILY MEDICINE CLINIC | Facility: CLINIC | Age: 46
End: 2021-03-04
Payer: COMMERCIAL

## 2021-03-04 VITALS
HEART RATE: 85 BPM | OXYGEN SATURATION: 98 % | BODY MASS INDEX: 23.04 KG/M2 | HEIGHT: 68 IN | SYSTOLIC BLOOD PRESSURE: 112 MMHG | DIASTOLIC BLOOD PRESSURE: 76 MMHG | WEIGHT: 152 LBS

## 2021-03-04 DIAGNOSIS — R25.2 CRAMPING OF HANDS: Primary | ICD-10-CM

## 2021-03-04 DIAGNOSIS — R68.89 SENSITIVITY TO THE COLD: ICD-10-CM

## 2021-03-04 PROCEDURE — 3078F DIAST BP <80 MM HG: CPT | Performed by: FAMILY MEDICINE

## 2021-03-04 PROCEDURE — 3074F SYST BP LT 130 MM HG: CPT | Performed by: FAMILY MEDICINE

## 2021-03-04 PROCEDURE — 3008F BODY MASS INDEX DOCD: CPT | Performed by: FAMILY MEDICINE

## 2021-03-04 PROCEDURE — 99213 OFFICE O/P EST LOW 20 MIN: CPT | Performed by: FAMILY MEDICINE

## 2021-03-04 NOTE — PROGRESS NOTES
HPI:    Patient ID: Sergio Herrera is a 54year old male who presents for hand cramping. HPI  Hands locked up when he was shoveling recently. Moreso right hand. Felt like hands were stiff and had difficulty moving them. Red in color.  No other skin daniel motion. He exhibits no tenderness. Neurological: He is alert. No sensory deficit or motor deficit. Skin: Skin is dry. No lesion and no rash noted. He is not diaphoretic. No erythema. No pallor. Fingers are cold to touch (patient walked here this AM).

## 2021-03-29 NOTE — PROGRESS NOTES
HPI:    Patient ID: Trevin Clarke is a 54year old male who presents for N/V/D and dark urine. HPI  Thinks he has food poisoning. Ate at [employer] about 5 days ago. Ate seafood salad. Sx started two hours afterwards. Sx include N/V/D and dark urine. urine volume, discharge, penile swelling, scrotal swelling, difficulty urinating, genital sores, penile pain, testicular pain and nocturia. Musculoskeletal: Negative. Skin: Negative. Neurological: Negative.             /60   Pulse 78   Temp 99 upper abdominal pain since last night; back pain since Friday

## 2021-04-01 ENCOUNTER — OFFICE VISIT (OUTPATIENT)
Dept: FAMILY MEDICINE CLINIC | Facility: CLINIC | Age: 46
End: 2021-04-01
Payer: COMMERCIAL

## 2021-04-01 VITALS
OXYGEN SATURATION: 98 % | WEIGHT: 154 LBS | HEART RATE: 84 BPM | DIASTOLIC BLOOD PRESSURE: 80 MMHG | SYSTOLIC BLOOD PRESSURE: 118 MMHG | BODY MASS INDEX: 23.34 KG/M2 | HEIGHT: 68 IN

## 2021-04-01 DIAGNOSIS — R19.5 LOOSE BOWEL MOVEMENTS: ICD-10-CM

## 2021-04-01 DIAGNOSIS — J30.2 SEASONAL ALLERGIC RHINITIS, UNSPECIFIED TRIGGER: ICD-10-CM

## 2021-04-01 DIAGNOSIS — R30.0 DYSURIA: Primary | ICD-10-CM

## 2021-04-01 PROCEDURE — 3074F SYST BP LT 130 MM HG: CPT | Performed by: FAMILY MEDICINE

## 2021-04-01 PROCEDURE — 3079F DIAST BP 80-89 MM HG: CPT | Performed by: FAMILY MEDICINE

## 2021-04-01 PROCEDURE — 99214 OFFICE O/P EST MOD 30 MIN: CPT | Performed by: FAMILY MEDICINE

## 2021-04-01 PROCEDURE — 3008F BODY MASS INDEX DOCD: CPT | Performed by: FAMILY MEDICINE

## 2021-04-01 PROCEDURE — 81002 URINALYSIS NONAUTO W/O SCOPE: CPT | Performed by: FAMILY MEDICINE

## 2021-04-01 RX ORDER — CHLORHEXIDINE GLUCONATE 0.12 MG/ML
RINSE ORAL
COMMUNITY
Start: 2021-03-10 | End: 2021-05-28

## 2021-04-01 NOTE — PROGRESS NOTES
HPI:    Patient ID: Fatou Cobos is a 51year old male who presents for burning with urination, burning with bowel movement, and sinuses. HPI  Received first dose of Pfizer vaccine on 3/18.   Developed burning with urination and with bowel movements the Normal appearance. He is well-developed. He is not ill-appearing, toxic-appearing or diaphoretic. HENT:      Head: Normocephalic and atraumatic.       Right Ear: Tympanic membrane, ear canal and external ear normal.      Left Ear: Tympanic membrane, ear c return precautions. 2. Loose bowel movements  -Increase fiber intake.   -Start metamucil daily.   -Increase water intake.   -Consider w/u if no/minimal improvement over next few weeks.      3. Seasonal allergic rhinitis, unspecified trigger  -Acute on c

## 2021-05-13 ENCOUNTER — HOSPITAL ENCOUNTER (OUTPATIENT)
Dept: GENERAL RADIOLOGY | Age: 46
Discharge: HOME | End: 2021-05-13
Attending: FAMILY MEDICINE
Payer: COMMERCIAL

## 2021-05-13 ENCOUNTER — OFFICE VISIT (OUTPATIENT)
Dept: FAMILY MEDICINE CLINIC | Facility: CLINIC | Age: 46
End: 2021-05-13
Payer: COMMERCIAL

## 2021-05-13 VITALS
WEIGHT: 154 LBS | HEIGHT: 68 IN | DIASTOLIC BLOOD PRESSURE: 80 MMHG | BODY MASS INDEX: 23.34 KG/M2 | OXYGEN SATURATION: 98 % | HEART RATE: 80 BPM | SYSTOLIC BLOOD PRESSURE: 118 MMHG

## 2021-05-13 DIAGNOSIS — R25.2 CRAMPING OF HANDS: ICD-10-CM

## 2021-05-13 DIAGNOSIS — R51.9 SCALP PAIN: Primary | ICD-10-CM

## 2021-05-13 PROCEDURE — 73130 X-RAY EXAM OF HAND: CPT | Performed by: FAMILY MEDICINE

## 2021-05-13 PROCEDURE — 3008F BODY MASS INDEX DOCD: CPT | Performed by: FAMILY MEDICINE

## 2021-05-13 PROCEDURE — 99213 OFFICE O/P EST LOW 20 MIN: CPT | Performed by: FAMILY MEDICINE

## 2021-05-13 PROCEDURE — 3074F SYST BP LT 130 MM HG: CPT | Performed by: FAMILY MEDICINE

## 2021-05-13 PROCEDURE — 3079F DIAST BP 80-89 MM HG: CPT | Performed by: FAMILY MEDICINE

## 2021-05-13 NOTE — PROGRESS NOTES
HPI:    Patient ID: Alexandra Omalley is a 51year old male who presents for scalp pain and hand cramping. HPI  Has pains on top of the scalp. This started a couple days ago. Has zits he's told. Thinks it's a sunburn. Was in sun 2 weeks ago.  Nothing unu Conjunctivae normal.   Cardiovascular:      Rate and Rhythm: Normal rate and regular rhythm. Pulses: Normal pulses. Heart sounds: Normal heart sounds. No murmur heard. No friction rub. No gallop.     Pulmonary:      Effort: Pulmonary effort is n

## 2021-05-14 PROBLEM — M89.8X4: Status: ACTIVE | Noted: 2021-05-14

## 2021-06-10 ENCOUNTER — OFFICE VISIT (OUTPATIENT)
Dept: FAMILY MEDICINE CLINIC | Facility: CLINIC | Age: 46
End: 2021-06-10
Payer: COMMERCIAL

## 2021-06-10 VITALS
HEART RATE: 86 BPM | WEIGHT: 153 LBS | HEIGHT: 68 IN | BODY MASS INDEX: 23.19 KG/M2 | SYSTOLIC BLOOD PRESSURE: 104 MMHG | OXYGEN SATURATION: 98 % | DIASTOLIC BLOOD PRESSURE: 68 MMHG

## 2021-06-10 DIAGNOSIS — J30.2 SEASONAL ALLERGIC RHINITIS, UNSPECIFIED TRIGGER: Primary | ICD-10-CM

## 2021-06-10 DIAGNOSIS — J30.9 ALLERGIC SINUSITIS: ICD-10-CM

## 2021-06-10 PROCEDURE — 3074F SYST BP LT 130 MM HG: CPT | Performed by: FAMILY MEDICINE

## 2021-06-10 PROCEDURE — 99214 OFFICE O/P EST MOD 30 MIN: CPT | Performed by: FAMILY MEDICINE

## 2021-06-10 PROCEDURE — 3078F DIAST BP <80 MM HG: CPT | Performed by: FAMILY MEDICINE

## 2021-06-10 PROCEDURE — 3008F BODY MASS INDEX DOCD: CPT | Performed by: FAMILY MEDICINE

## 2021-06-10 RX ORDER — AMOXICILLIN AND CLAVULANATE POTASSIUM 875; 125 MG/1; MG/1
1 TABLET, FILM COATED ORAL 2 TIMES DAILY
Qty: 20 TABLET | Refills: 0 | Status: SHIPPED | OUTPATIENT
Start: 2021-06-10 | End: 2021-06-20

## 2021-06-10 RX ORDER — METHYLPREDNISOLONE 4 MG/1
TABLET ORAL
Qty: 1 EACH | Refills: 0 | Status: SHIPPED | OUTPATIENT
Start: 2021-06-10 | End: 2022-01-05

## 2021-06-10 NOTE — PROGRESS NOTES
HPI:    Patient ID: Fatou Cobos is a 51year old male who presents for worsening allergies. HPI  Sinuses fluctuate a lot. Using flonase daily and zyrtec BID. Compliant. Sinus pain and pressure occurs on right side. No fevers.    No nasal congestion reviewed and are negative. /68   Pulse 86   Ht 5' 8\" (1.727 m)   Wt 153 lb (69.4 kg)   SpO2 98%   BMI 23.26 kg/m²     PHYSICAL EXAM:   Physical Exam  Constitutional:       General: He is not in acute distress.      Appearance: Normal appear chronic.   -Will trail augmentin with medrol dose pack.   -Continue daily flonase and zyrtec.   -Consider trial of singulair in future vs ENT/allergy referral.     Meds This Visit:  Requested Prescriptions     Signed Prescriptions Disp Refills   • Amoxicill

## 2021-09-28 ENCOUNTER — TELEPHONE (OUTPATIENT)
Dept: FAMILY MEDICINE CLINIC | Facility: CLINIC | Age: 46
End: 2021-09-28

## 2021-09-28 NOTE — TELEPHONE ENCOUNTER
Spoke to Dr. Naseem Peralta who has concerns about his mental health/capacity. I did discuss that he does have some anxiety, but I do not have any concerns regarding his ability to comprehend my recommendations & our discussions.  She will call back if any addition

## 2021-09-28 NOTE — TELEPHONE ENCOUNTER
Called Dr Balbir Levin from [facility] wanting to speak with DR. Snow. Informed that will route to him.         Last visit 0610/21 MP

## 2021-12-19 ENCOUNTER — PATIENT MESSAGE (OUTPATIENT)
Dept: SURGERY | Facility: CLINIC | Age: 46
End: 2021-12-19

## 2021-12-23 RX ORDER — METHENAMINE, SODIUM PHOSPHATE, MONOBASIC, MONOHYDRATE, PHENYL SALICYLATE, METHYLENE BLUE, AND HYOSCYAMINE SULFATE 120; 40.8; 36; 10; .12 MG/1; MG/1; MG/1; MG/1; MG/1
CAPSULE ORAL
Qty: 30 CAPSULE | Refills: 1 | Status: SHIPPED | OUTPATIENT
Start: 2021-12-23

## 2021-12-23 NOTE — TELEPHONE ENCOUNTER
Brandy Burgess RN 12/21/2021 5:15 PM CST    ----- Message from Holly Fu RN sent at 12/21/2021 5:15 PM CST -----       ----- Message from Marisa Dunaway to Aura Victoria.VARSHA sent at 12/21/2021 1:08 PM -----   Uroflow (Blue) Pill       ----

## 2021-12-26 ENCOUNTER — PATIENT MESSAGE (OUTPATIENT)
Dept: SURGERY | Facility: CLINIC | Age: 46
End: 2021-12-26

## 2022-02-15 ENCOUNTER — OFFICE VISIT (OUTPATIENT)
Dept: FAMILY MEDICINE CLINIC | Facility: CLINIC | Age: 47
End: 2022-02-15
Payer: COMMERCIAL

## 2022-02-15 VITALS
BODY MASS INDEX: 23.34 KG/M2 | SYSTOLIC BLOOD PRESSURE: 133 MMHG | HEIGHT: 68 IN | HEART RATE: 102 BPM | DIASTOLIC BLOOD PRESSURE: 88 MMHG | WEIGHT: 154 LBS

## 2022-02-15 DIAGNOSIS — Z00.00 ANNUAL PHYSICAL EXAM: Primary | ICD-10-CM

## 2022-02-15 PROCEDURE — 99386 PREV VISIT NEW AGE 40-64: CPT | Performed by: FAMILY MEDICINE

## 2022-02-15 PROCEDURE — 3075F SYST BP GE 130 - 139MM HG: CPT | Performed by: FAMILY MEDICINE

## 2022-02-15 PROCEDURE — 3008F BODY MASS INDEX DOCD: CPT | Performed by: FAMILY MEDICINE

## 2022-02-15 PROCEDURE — 3079F DIAST BP 80-89 MM HG: CPT | Performed by: FAMILY MEDICINE

## 2022-02-16 NOTE — PROGRESS NOTES
Subjective:   Patient ID: Guille Mobley is a 51year old male. Patient states that this am his feet were red but not any more  He thinks that they were a little swollen but its all good now. Denies any pain in the feet   He also needs physical /4  History reviewed        History/Other:        Constitutional: Negative. Negative for activity change, appetite change, diaphoresis and fatigue. Respiratory: Negative. Negative for apnea, cough, chest tightness and shortness of breath. Cardiovascular: Negative. Negative for chest pain, palpitations and leg swelling. Gastrointestinal: Negative. Negative for abdominal pain. Skin: Negative. Psychiatric/Behavioral: Negative. Allergies:  Barium Sulfate          OTHER (SEE COMMENTS)  No Known Allergies      HIVES  Peanut (Diagnostic)     HIVES  Ciprofloxacin           RASH  Penicillins             RASH    Comment:Pt states he can take Amoxicillin, but Penicillin             caused a rash  Doxycycline             NAUSEA ONLY  Gel Base                RASH    Comment:Gel used for ultrasound             Gel used for ultrasound    Objective:   Physical Exam  Constitutional:       Appearance: He is well-developed. Cardiovascular:      Rate and Rhythm: Normal rate and regular rhythm. Heart sounds: Normal heart sounds. Pulmonary:      Effort: Pulmonary effort is normal.      Breath sounds: Normal breath sounds. Abdominal:      General: Bowel sounds are normal.      Palpations: Abdomen is soft. Skin:     General: Skin is warm and dry. Neurological:      Mental Status: He is alert. Deep Tendon Reflexes: Reflexes are normal and symmetric.          Assessment & Plan:   Annual physical exam  (primary encounter diagnosis)  Patient doing well  Will f/u with results   Orders Placed This Encounter      Comp Metabolic Panel (14)      Lipid Panel      Assay, Thyroid Stim Hormone      CBC With Differential With Platelet      Meds This Visit:  Requested Prescriptions      No prescriptions requested or ordered in this encounter       Imaging & Referrals:  None

## 2022-02-23 ENCOUNTER — LAB ENCOUNTER (OUTPATIENT)
Dept: LAB | Age: 47
End: 2022-02-23
Attending: FAMILY MEDICINE
Payer: COMMERCIAL

## 2022-02-23 DIAGNOSIS — Z00.00 ANNUAL PHYSICAL EXAM: ICD-10-CM

## 2022-02-23 DIAGNOSIS — R73.9 HYPERGLYCEMIA: ICD-10-CM

## 2022-02-23 LAB
ALBUMIN SERPL-MCNC: 4.1 G/DL (ref 3.4–5)
ALBUMIN/GLOB SERPL: 1.5 {RATIO} (ref 1–2)
ALP LIVER SERPL-CCNC: 67 U/L
ALT SERPL-CCNC: 47 U/L
ANION GAP SERPL CALC-SCNC: 3 MMOL/L (ref 0–18)
AST SERPL-CCNC: 19 U/L (ref 15–37)
BASOPHILS # BLD AUTO: 0.05 X10(3) UL (ref 0–0.2)
BASOPHILS NFR BLD AUTO: 1 %
BILIRUB SERPL-MCNC: 0.4 MG/DL (ref 0.1–2)
BUN BLD-MCNC: 26 MG/DL (ref 7–18)
BUN/CREAT SERPL: 29.2 (ref 10–20)
CALCIUM BLD-MCNC: 8.9 MG/DL (ref 8.5–10.1)
CHLORIDE SERPL-SCNC: 104 MMOL/L (ref 98–112)
CHOLEST SERPL-MCNC: 165 MG/DL (ref ?–200)
CO2 SERPL-SCNC: 32 MMOL/L (ref 21–32)
CREAT BLD-MCNC: 0.89 MG/DL
DEPRECATED RDW RBC AUTO: 40.1 FL (ref 35.1–46.3)
EOSINOPHIL # BLD AUTO: 0.08 X10(3) UL (ref 0–0.7)
EOSINOPHIL NFR BLD AUTO: 1.5 %
ERYTHROCYTE [DISTWIDTH] IN BLOOD BY AUTOMATED COUNT: 12.7 % (ref 11–15)
FASTING PATIENT LIPID ANSWER: NO
FASTING STATUS PATIENT QL REPORTED: NO
GLOBULIN PLAS-MCNC: 2.7 G/DL (ref 2.8–4.4)
GLUCOSE BLD-MCNC: 123 MG/DL (ref 70–99)
HCT VFR BLD AUTO: 43.8 %
HDLC SERPL-MCNC: 61 MG/DL (ref 40–59)
HGB BLD-MCNC: 14.3 G/DL
IMM GRANULOCYTES # BLD AUTO: 0.02 X10(3) UL (ref 0–1)
IMM GRANULOCYTES NFR BLD: 0.4 %
LDLC SERPL CALC-MCNC: 86 MG/DL (ref ?–100)
LYMPHOCYTES # BLD AUTO: 1.61 X10(3) UL (ref 1–4)
LYMPHOCYTES NFR BLD AUTO: 31.1 %
MCH RBC QN AUTO: 28.5 PG (ref 26–34)
MCHC RBC AUTO-ENTMCNC: 32.6 G/DL (ref 31–37)
MCV RBC AUTO: 87.3 FL
MONOCYTES # BLD AUTO: 0.59 X10(3) UL (ref 0.1–1)
MONOCYTES NFR BLD AUTO: 11.4 %
NEUTROPHILS # BLD AUTO: 2.82 X10 (3) UL (ref 1.5–7.7)
NEUTROPHILS # BLD AUTO: 2.82 X10(3) UL (ref 1.5–7.7)
NEUTROPHILS NFR BLD AUTO: 54.6 %
NONHDLC SERPL-MCNC: 104 MG/DL (ref ?–130)
OSMOLALITY SERPL CALC.SUM OF ELEC: 294 MOSM/KG (ref 275–295)
PLATELET # BLD AUTO: 244 10(3)UL (ref 150–450)
POTASSIUM SERPL-SCNC: 4.3 MMOL/L (ref 3.5–5.1)
PROT SERPL-MCNC: 6.8 G/DL (ref 6.4–8.2)
RBC # BLD AUTO: 5.02 X10(6)UL
SODIUM SERPL-SCNC: 139 MMOL/L (ref 136–145)
TRIGL SERPL-MCNC: 99 MG/DL (ref 30–149)
TSI SER-ACNC: 1.46 MIU/ML (ref 0.36–3.74)
VLDLC SERPL CALC-MCNC: 16 MG/DL (ref 0–30)
WBC # BLD AUTO: 5.2 X10(3) UL (ref 4–11)

## 2022-02-23 PROCEDURE — 83036 HEMOGLOBIN GLYCOSYLATED A1C: CPT

## 2022-02-23 PROCEDURE — 80061 LIPID PANEL: CPT

## 2022-02-23 PROCEDURE — 85025 COMPLETE CBC W/AUTO DIFF WBC: CPT

## 2022-02-23 PROCEDURE — 84443 ASSAY THYROID STIM HORMONE: CPT

## 2022-02-23 PROCEDURE — 36415 COLL VENOUS BLD VENIPUNCTURE: CPT

## 2022-02-23 PROCEDURE — 80053 COMPREHEN METABOLIC PANEL: CPT

## 2022-02-24 LAB
EST. AVERAGE GLUCOSE BLD GHB EST-MCNC: 94 MG/DL (ref 68–126)
HBA1C MFR BLD: 4.9 % (ref ?–5.7)

## 2022-03-22 ENCOUNTER — NURSE TRIAGE (OUTPATIENT)
Dept: FAMILY MEDICINE CLINIC | Facility: CLINIC | Age: 47
End: 2022-03-22

## 2022-03-23 NOTE — TELEPHONE ENCOUNTER
----- Message from Mery Cary RN sent at 3/22/2022  5:51 PM CDT -----  Regarding: FW: Back      ----- Message -----  From: Matias Simmons RN  Sent: 3/22/2022   3:00 PM CDT  To: Mery Cary RN  Subject: FW: Back                                             ----- Message -----  From: Oliver Keys  Sent: 3/22/2022   7:16 AM CDT  To: Em Rn Triage  Subject: Back                                             Lauri Mcbride    My Middle part of my Back is hurting . I think it is because of the weather change. Is there something I can try to relieve this back pain. It is goes like the sign wave.     Please Advise    Aide Morel

## 2022-03-24 ENCOUNTER — HOSPITAL ENCOUNTER (OUTPATIENT)
Dept: GENERAL RADIOLOGY | Age: 47
Discharge: HOME | End: 2022-03-24
Attending: FAMILY MEDICINE
Payer: COMMERCIAL

## 2022-03-24 ENCOUNTER — OFFICE VISIT (OUTPATIENT)
Dept: FAMILY MEDICINE CLINIC | Facility: CLINIC | Age: 47
End: 2022-03-24
Payer: COMMERCIAL

## 2022-03-24 VITALS
SYSTOLIC BLOOD PRESSURE: 124 MMHG | HEIGHT: 68 IN | HEART RATE: 103 BPM | DIASTOLIC BLOOD PRESSURE: 78 MMHG | RESPIRATION RATE: 15 BRPM | BODY MASS INDEX: 23.27 KG/M2 | WEIGHT: 153.5 LBS

## 2022-03-24 DIAGNOSIS — M54.6 ACUTE BILATERAL THORACIC BACK PAIN: Primary | ICD-10-CM

## 2022-03-24 DIAGNOSIS — M54.6 ACUTE BILATERAL THORACIC BACK PAIN: ICD-10-CM

## 2022-03-24 PROCEDURE — 3078F DIAST BP <80 MM HG: CPT | Performed by: FAMILY MEDICINE

## 2022-03-24 PROCEDURE — 72072 X-RAY EXAM THORAC SPINE 3VWS: CPT | Performed by: FAMILY MEDICINE

## 2022-03-24 PROCEDURE — 99214 OFFICE O/P EST MOD 30 MIN: CPT | Performed by: FAMILY MEDICINE

## 2022-03-24 PROCEDURE — 3074F SYST BP LT 130 MM HG: CPT | Performed by: FAMILY MEDICINE

## 2022-03-24 PROCEDURE — 3008F BODY MASS INDEX DOCD: CPT | Performed by: FAMILY MEDICINE

## 2022-03-24 RX ORDER — CYCLOBENZAPRINE HCL 10 MG
10 TABLET ORAL 2 TIMES DAILY PRN
Qty: 30 TABLET | Refills: 1 | Status: SHIPPED | OUTPATIENT
Start: 2022-03-24

## 2022-05-19 ENCOUNTER — OFFICE VISIT (OUTPATIENT)
Dept: FAMILY MEDICINE CLINIC | Facility: CLINIC | Age: 47
End: 2022-05-19
Payer: COMMERCIAL

## 2022-05-19 VITALS
SYSTOLIC BLOOD PRESSURE: 122 MMHG | HEART RATE: 90 BPM | DIASTOLIC BLOOD PRESSURE: 76 MMHG | WEIGHT: 150 LBS | OXYGEN SATURATION: 96 % | BODY MASS INDEX: 22.73 KG/M2 | HEIGHT: 68 IN

## 2022-05-19 DIAGNOSIS — J32.9 CHRONIC SINUSITIS, UNSPECIFIED LOCATION: ICD-10-CM

## 2022-05-19 DIAGNOSIS — R10.32 LEFT GROIN PAIN: Primary | ICD-10-CM

## 2022-05-19 PROCEDURE — 99213 OFFICE O/P EST LOW 20 MIN: CPT | Performed by: FAMILY MEDICINE

## 2022-05-19 PROCEDURE — 3078F DIAST BP <80 MM HG: CPT | Performed by: FAMILY MEDICINE

## 2022-05-19 PROCEDURE — 3074F SYST BP LT 130 MM HG: CPT | Performed by: FAMILY MEDICINE

## 2022-05-19 PROCEDURE — 3008F BODY MASS INDEX DOCD: CPT | Performed by: FAMILY MEDICINE

## 2022-05-22 ENCOUNTER — HOSPITAL ENCOUNTER (OUTPATIENT)
Age: 47
Discharge: HOME | End: 2022-05-22
Payer: COMMERCIAL

## 2022-05-22 VITALS
SYSTOLIC BLOOD PRESSURE: 132 MMHG | TEMPERATURE: 98 F | RESPIRATION RATE: 18 BRPM | DIASTOLIC BLOOD PRESSURE: 82 MMHG | HEART RATE: 89 BPM | OXYGEN SATURATION: 100 %

## 2022-05-22 DIAGNOSIS — K62.89 ANAL BURNING: ICD-10-CM

## 2022-05-22 DIAGNOSIS — R19.7 DIARRHEA, UNSPECIFIED TYPE: Primary | ICD-10-CM

## 2022-05-22 DIAGNOSIS — K64.8 INTERNAL HEMORRHOID: ICD-10-CM

## 2022-05-22 NOTE — ED INITIAL ASSESSMENT (HPI)
Pt eating some pizza and then over the last 24 hours has been having burning in stomach and anus. Pt states a few bouts of diarrhea, pt states now when he goes to the bathroom it burns. Pt denies vomiting or nausea.

## 2022-05-31 ENCOUNTER — OFFICE VISIT (OUTPATIENT)
Dept: OTOLARYNGOLOGY | Facility: CLINIC | Age: 47
End: 2022-05-31
Payer: COMMERCIAL

## 2022-05-31 VITALS — HEIGHT: 68 IN | WEIGHT: 150 LBS | BODY MASS INDEX: 22.73 KG/M2

## 2022-05-31 DIAGNOSIS — J34.2 NASAL SEPTAL DEVIATION: ICD-10-CM

## 2022-05-31 DIAGNOSIS — J34.89 SINUS PRESSURE: Primary | ICD-10-CM

## 2022-05-31 DIAGNOSIS — J34.3 NASAL TURBINATE HYPERTROPHY: ICD-10-CM

## 2022-05-31 PROCEDURE — 3008F BODY MASS INDEX DOCD: CPT | Performed by: SPECIALIST

## 2022-05-31 PROCEDURE — 99213 OFFICE O/P EST LOW 20 MIN: CPT | Performed by: SPECIALIST

## 2022-05-31 RX ORDER — PREDNISONE 20 MG/1
20 TABLET ORAL DAILY
Qty: 3 TABLET | Refills: 0 | Status: SHIPPED | OUTPATIENT
Start: 2022-05-31

## 2022-05-31 RX ORDER — AZITHROMYCIN 250 MG/1
TABLET, FILM COATED ORAL
Qty: 11 TABLET | Refills: 0 | Status: SHIPPED | OUTPATIENT
Start: 2022-05-31

## 2022-05-31 NOTE — PATIENT INSTRUCTIONS
You have a very severe right septal deviation. You have severe right sinus pressure. Placed on a 10-day course of Zithromax and 3 days of prednisone. Can continue to use the Sudafed as needed. Follow-up in 2 to 3 weeks time, sooner if problems. May consider a fiberoptic scope or a CT scan if symptoms are not improved after that time.

## 2022-06-22 ENCOUNTER — OFFICE VISIT (OUTPATIENT)
Dept: FAMILY MEDICINE CLINIC | Facility: CLINIC | Age: 47
End: 2022-06-22
Payer: COMMERCIAL

## 2022-06-22 VITALS
SYSTOLIC BLOOD PRESSURE: 111 MMHG | HEART RATE: 106 BPM | BODY MASS INDEX: 22.73 KG/M2 | DIASTOLIC BLOOD PRESSURE: 76 MMHG | WEIGHT: 150 LBS | HEIGHT: 68 IN

## 2022-06-22 DIAGNOSIS — T14.8XXA BLOOD BLISTER: Primary | ICD-10-CM

## 2022-06-22 DIAGNOSIS — L30.9 DERMATITIS: ICD-10-CM

## 2022-06-22 PROCEDURE — 3008F BODY MASS INDEX DOCD: CPT | Performed by: NURSE PRACTITIONER

## 2022-06-22 PROCEDURE — 3074F SYST BP LT 130 MM HG: CPT | Performed by: NURSE PRACTITIONER

## 2022-06-22 PROCEDURE — 3078F DIAST BP <80 MM HG: CPT | Performed by: NURSE PRACTITIONER

## 2022-06-22 PROCEDURE — 99213 OFFICE O/P EST LOW 20 MIN: CPT | Performed by: NURSE PRACTITIONER

## 2022-06-30 ENCOUNTER — HOSPITAL ENCOUNTER (OUTPATIENT)
Dept: ULTRASOUND IMAGING | Facility: HOSPITAL | Age: 47
Discharge: HOME | End: 2022-06-30
Attending: FAMILY MEDICINE
Payer: COMMERCIAL

## 2022-06-30 DIAGNOSIS — R10.32 LEFT GROIN PAIN: ICD-10-CM

## 2022-06-30 PROCEDURE — 76882 US LMTD JT/FCL EVL NVASC XTR: CPT | Performed by: FAMILY MEDICINE

## 2022-08-11 ENCOUNTER — LAB ENCOUNTER (OUTPATIENT)
Dept: LAB | Facility: HOSPITAL | Age: 47
End: 2022-08-11
Attending: NURSE PRACTITIONER
Payer: COMMERCIAL

## 2022-08-11 ENCOUNTER — OFFICE VISIT (OUTPATIENT)
Dept: INTERNAL MEDICINE CLINIC | Facility: CLINIC | Age: 47
End: 2022-08-11
Payer: COMMERCIAL

## 2022-08-11 VITALS
DIASTOLIC BLOOD PRESSURE: 80 MMHG | RESPIRATION RATE: 18 BRPM | SYSTOLIC BLOOD PRESSURE: 118 MMHG | BODY MASS INDEX: 23.36 KG/M2 | HEART RATE: 108 BPM | HEIGHT: 68 IN | WEIGHT: 154.13 LBS

## 2022-08-11 DIAGNOSIS — R30.0 DYSURIA: Primary | ICD-10-CM

## 2022-08-11 DIAGNOSIS — R30.0 DYSURIA: ICD-10-CM

## 2022-08-11 LAB
BILIRUB UR QL: NEGATIVE
CLARITY UR: CLEAR
COLOR UR: YELLOW
GLUCOSE UR-MCNC: NEGATIVE MG/DL
HGB UR QL STRIP.AUTO: NEGATIVE
KETONES UR-MCNC: NEGATIVE MG/DL
LEUKOCYTE ESTERASE UR QL STRIP.AUTO: NEGATIVE
NITRITE UR QL STRIP.AUTO: NEGATIVE
PH UR: 6 [PH] (ref 5–8)
PROT UR-MCNC: NEGATIVE MG/DL
SP GR UR STRIP: 1.01 (ref 1–1.03)
UROBILINOGEN UR STRIP-ACNC: 0.2

## 2022-08-11 PROCEDURE — 3079F DIAST BP 80-89 MM HG: CPT | Performed by: NURSE PRACTITIONER

## 2022-08-11 PROCEDURE — 99213 OFFICE O/P EST LOW 20 MIN: CPT | Performed by: NURSE PRACTITIONER

## 2022-08-11 PROCEDURE — 3008F BODY MASS INDEX DOCD: CPT | Performed by: NURSE PRACTITIONER

## 2022-08-11 PROCEDURE — 81003 URINALYSIS AUTO W/O SCOPE: CPT

## 2022-08-11 PROCEDURE — 3074F SYST BP LT 130 MM HG: CPT | Performed by: NURSE PRACTITIONER

## 2022-08-11 PROCEDURE — 87086 URINE CULTURE/COLONY COUNT: CPT

## 2022-08-11 RX ORDER — SULFAMETHOXAZOLE AND TRIMETHOPRIM 800; 160 MG/1; MG/1
1 TABLET ORAL 2 TIMES DAILY
Qty: 20 TABLET | Refills: 0 | Status: SHIPPED | OUTPATIENT
Start: 2022-08-11 | End: 2022-08-21

## 2022-08-11 RX ORDER — SULFAMETHOXAZOLE AND TRIMETHOPRIM 800; 160 MG/1; MG/1
1 TABLET ORAL 2 TIMES DAILY
Qty: 20 TABLET | Refills: 0 | Status: SHIPPED | OUTPATIENT
Start: 2022-08-11 | End: 2022-08-11 | Stop reason: CLARIF

## 2022-08-12 NOTE — ASSESSMENT & PLAN NOTE
Dysuria  Pain on urination since Sunday. Patient complaining of pain at the tip of his penis. No visual signs of redness, or lesions. Possible UTI, hx of prostatitis. No fever, chills, body aches or feeling ill. Plan  Hydrate with fluids  Relevant Orders   URINE CULTURE, ROUTINE   URINALYSIS, ROUTINE   CHLAMYDIA (NISSERIA) GONORRHOEAE DNA, SDA       sulfamethoxazole-trimethoprim DS (BACTRIM DS) 800-160 MG Oral Tab per tablet Take 1 tablet by mouth 2 (two) times daily for 10 days.

## 2022-09-12 ENCOUNTER — NURSE TRIAGE (OUTPATIENT)
Dept: FAMILY MEDICINE CLINIC | Facility: CLINIC | Age: 47
End: 2022-09-12

## 2022-09-12 ENCOUNTER — PATIENT MESSAGE (OUTPATIENT)
Dept: FAMILY MEDICINE CLINIC | Facility: CLINIC | Age: 47
End: 2022-09-12

## 2022-09-12 NOTE — TELEPHONE ENCOUNTER
----- Message from María Elena Alexandre RN sent at 9/12/2022  3:09 PM CDT -----  Regarding: FW: Sinus 22      ----- Message -----  From: Whit Islas  Sent: 9/12/2022   3:05 PM CDT  To: Em Rn Triage  Subject: Sinus 22                                         Hi Dr Saint Confer,    My Sinus Issues are not going away. Is there new ideas that I can use to control my sinus Issues. The Weather  is not helping :(    Is there any test that needs to be done. They said you are booked until November. Please Advise.     Thank You,

## 2022-09-12 NOTE — TELEPHONE ENCOUNTER
See acute encounter 9/12/22. From: Jenny Prado  To: DO Jacinda  Sent: 9/12/2022  3:05 PM CDT  Subject: Sinus 22    Hi Dr Nash Morning,    My Sinus Issues are not going away. Is there new ideas that I can use to control my sinus Issues. The Weather  is not helping :(    Is there any test that needs to be done. They said you are booked until November. Please Advise.     Thank You,

## 2022-09-13 ENCOUNTER — OFFICE VISIT (OUTPATIENT)
Dept: FAMILY MEDICINE CLINIC | Facility: CLINIC | Age: 47
End: 2022-09-13
Payer: COMMERCIAL

## 2022-09-13 VITALS
HEIGHT: 68 IN | SYSTOLIC BLOOD PRESSURE: 108 MMHG | BODY MASS INDEX: 23.34 KG/M2 | DIASTOLIC BLOOD PRESSURE: 72 MMHG | OXYGEN SATURATION: 98 % | HEART RATE: 115 BPM | WEIGHT: 154 LBS

## 2022-09-13 DIAGNOSIS — J32.9 CHRONIC SINUSITIS, UNSPECIFIED LOCATION: Primary | ICD-10-CM

## 2022-09-13 PROCEDURE — 99214 OFFICE O/P EST MOD 30 MIN: CPT | Performed by: FAMILY MEDICINE

## 2022-09-13 PROCEDURE — 3074F SYST BP LT 130 MM HG: CPT | Performed by: FAMILY MEDICINE

## 2022-09-13 PROCEDURE — 3008F BODY MASS INDEX DOCD: CPT | Performed by: FAMILY MEDICINE

## 2022-09-13 PROCEDURE — 3078F DIAST BP <80 MM HG: CPT | Performed by: FAMILY MEDICINE

## 2022-09-13 RX ORDER — OXYMETAZOLINE HYDROCHLORIDE 0.05 G/100ML
1 SPRAY NASAL 2 TIMES DAILY
Qty: 37 ML | Refills: 0 | Status: SHIPPED | OUTPATIENT
Start: 2022-09-13 | End: 2022-09-16

## 2022-09-30 ENCOUNTER — OFFICE VISIT (OUTPATIENT)
Dept: OTOLARYNGOLOGY | Facility: CLINIC | Age: 47
End: 2022-09-30

## 2022-09-30 VITALS — HEIGHT: 68 IN | BODY MASS INDEX: 23.34 KG/M2 | WEIGHT: 154 LBS

## 2022-09-30 DIAGNOSIS — J34.89 SINUS PRESSURE: Primary | ICD-10-CM

## 2022-09-30 DIAGNOSIS — J32.8 OTHER CHRONIC SINUSITIS: ICD-10-CM

## 2022-09-30 DIAGNOSIS — J34.2 NASAL SEPTAL DEVIATION: ICD-10-CM

## 2022-09-30 DIAGNOSIS — J34.3 NASAL TURBINATE HYPERTROPHY: ICD-10-CM

## 2022-09-30 PROCEDURE — 99213 OFFICE O/P EST LOW 20 MIN: CPT | Performed by: SPECIALIST

## 2022-09-30 PROCEDURE — 3008F BODY MASS INDEX DOCD: CPT | Performed by: SPECIALIST

## 2022-09-30 RX ORDER — AZELASTINE 1 MG/ML
2 SPRAY, METERED NASAL 2 TIMES DAILY
Qty: 30 ML | Refills: 5 | Status: SHIPPED | OUTPATIENT
Start: 2022-09-30

## 2022-09-30 NOTE — PATIENT INSTRUCTIONS
You have a very severe right septal deviation. You also have persistent right sinus pressure despite antibiotics and steroids. I placed you on a trial of astelin nasal spray. 2 sprays to each nostril twice daily.

## 2022-10-29 ENCOUNTER — HOSPITAL ENCOUNTER (OUTPATIENT)
Dept: CT IMAGING | Age: 47
Discharge: HOME | End: 2022-10-29
Attending: SPECIALIST
Payer: COMMERCIAL

## 2022-10-29 DIAGNOSIS — J34.89 SINUS PRESSURE: ICD-10-CM

## 2022-10-29 DIAGNOSIS — J32.8 OTHER CHRONIC SINUSITIS: ICD-10-CM

## 2022-10-29 PROCEDURE — 70486 CT MAXILLOFACIAL W/O DYE: CPT | Performed by: SPECIALIST

## 2022-10-31 NOTE — PROGRESS NOTES
CT scan of the sinuses other than right septal deviation. Patient was notified. Can use allergy medicine on an as-needed basis.

## 2022-11-09 ENCOUNTER — MED REC SCAN ONLY (OUTPATIENT)
Dept: FAMILY MEDICINE CLINIC | Facility: CLINIC | Age: 47
End: 2022-11-09

## 2022-11-28 ENCOUNTER — OFFICE VISIT (OUTPATIENT)
Dept: PHYSICAL MEDICINE AND REHAB | Facility: CLINIC | Age: 47
End: 2022-11-28
Payer: COMMERCIAL

## 2022-11-28 VITALS
HEIGHT: 68 IN | HEART RATE: 82 BPM | BODY MASS INDEX: 24.1 KG/M2 | DIASTOLIC BLOOD PRESSURE: 86 MMHG | OXYGEN SATURATION: 100 % | WEIGHT: 159 LBS | SYSTOLIC BLOOD PRESSURE: 133 MMHG

## 2022-11-28 DIAGNOSIS — M54.16 LUMBAR RADICULOPATHY: Primary | ICD-10-CM

## 2022-11-28 DIAGNOSIS — M25.551 BILATERAL HIP PAIN: ICD-10-CM

## 2022-11-28 DIAGNOSIS — M41.25 OTHER IDIOPATHIC SCOLIOSIS, THORACOLUMBAR REGION: ICD-10-CM

## 2022-11-28 DIAGNOSIS — M25.552 BILATERAL HIP PAIN: ICD-10-CM

## 2022-11-28 PROCEDURE — 3008F BODY MASS INDEX DOCD: CPT | Performed by: PHYSICAL MEDICINE & REHABILITATION

## 2022-11-28 PROCEDURE — 3079F DIAST BP 80-89 MM HG: CPT | Performed by: PHYSICAL MEDICINE & REHABILITATION

## 2022-11-28 PROCEDURE — 3075F SYST BP GE 130 - 139MM HG: CPT | Performed by: PHYSICAL MEDICINE & REHABILITATION

## 2022-11-28 PROCEDURE — 99204 OFFICE O/P NEW MOD 45 MIN: CPT | Performed by: PHYSICAL MEDICINE & REHABILITATION

## 2022-11-28 RX ORDER — METHENAMINE, SODIUM PHOSPHATE, MONOBASIC, MONOHYDRATE, PHENYL SALICYLATE, METHYLENE BLUE, AND HYOSCYAMINE SULFATE 120; 40.8; 36; 10; .12 MG/1; MG/1; MG/1; MG/1; MG/1
1 CAPSULE ORAL 4 TIMES DAILY PRN
COMMUNITY
Start: 2022-10-14

## 2022-11-29 ENCOUNTER — HOSPITAL ENCOUNTER (OUTPATIENT)
Dept: GENERAL RADIOLOGY | Facility: HOSPITAL | Age: 47
Discharge: HOME | End: 2022-11-29
Attending: PHYSICAL MEDICINE & REHABILITATION
Payer: COMMERCIAL

## 2022-11-29 DIAGNOSIS — M25.551 BILATERAL HIP PAIN: ICD-10-CM

## 2022-11-29 DIAGNOSIS — M25.552 BILATERAL HIP PAIN: ICD-10-CM

## 2022-11-29 DIAGNOSIS — M41.25 OTHER IDIOPATHIC SCOLIOSIS, THORACOLUMBAR REGION: ICD-10-CM

## 2022-11-29 DIAGNOSIS — M54.16 LUMBAR RADICULOPATHY: ICD-10-CM

## 2022-11-29 PROCEDURE — 73502 X-RAY EXAM HIP UNI 2-3 VIEWS: CPT | Performed by: PHYSICAL MEDICINE & REHABILITATION

## 2022-11-29 PROCEDURE — 72082 X-RAY EXAM ENTIRE SPI 2/3 VW: CPT | Performed by: PHYSICAL MEDICINE & REHABILITATION

## 2022-11-29 PROCEDURE — 72110 X-RAY EXAM L-2 SPINE 4/>VWS: CPT | Performed by: PHYSICAL MEDICINE & REHABILITATION

## 2022-11-29 NOTE — PATIENT INSTRUCTIONS
Plan  He will get a MRI of the lumbar spine. He will get lumbar spine flexion and extension x-rays and scoliosis x-rays. He will get bilateral hip x-rays. He will follow up after having the above.

## 2023-01-29 PROBLEM — Z98.1 STATUS POST THORACIC SPINAL FUSION: Status: ACTIVE | Noted: 2023-01-29

## 2023-05-24 ENCOUNTER — OFFICE VISIT (OUTPATIENT)
Dept: SURGERY | Facility: CLINIC | Age: 48
End: 2023-05-24

## 2023-05-24 VITALS — DIASTOLIC BLOOD PRESSURE: 90 MMHG | SYSTOLIC BLOOD PRESSURE: 136 MMHG | HEART RATE: 113 BPM

## 2023-05-24 DIAGNOSIS — Z87.448 HISTORY OF URETHRAL STRICTURE: ICD-10-CM

## 2023-05-24 DIAGNOSIS — R30.0 DYSURIA: Primary | ICD-10-CM

## 2023-05-24 LAB
BILIRUB UR QL: NEGATIVE
CLARITY UR: CLEAR
GLUCOSE UR-MCNC: NORMAL MG/DL
HGB UR QL STRIP.AUTO: NEGATIVE
KETONES UR-MCNC: NEGATIVE MG/DL
LEUKOCYTE ESTERASE UR QL STRIP.AUTO: NEGATIVE
NITRITE UR QL STRIP.AUTO: NEGATIVE
PH UR: 5 [PH] (ref 5–8)
PROT UR-MCNC: NEGATIVE MG/DL
SP GR UR STRIP: 1.02 (ref 1–1.03)
UROBILINOGEN UR STRIP-ACNC: NORMAL

## 2023-05-24 PROCEDURE — 3080F DIAST BP >= 90 MM HG: CPT | Performed by: NURSE PRACTITIONER

## 2023-05-24 PROCEDURE — 3075F SYST BP GE 130 - 139MM HG: CPT | Performed by: NURSE PRACTITIONER

## 2023-05-24 PROCEDURE — 99214 OFFICE O/P EST MOD 30 MIN: CPT | Performed by: NURSE PRACTITIONER

## 2023-06-26 ENCOUNTER — OFFICE VISIT (OUTPATIENT)
Dept: FAMILY MEDICINE CLINIC | Facility: CLINIC | Age: 48
End: 2023-06-26

## 2023-06-26 ENCOUNTER — ORDER TRANSCRIPTION (OUTPATIENT)
Dept: PHYSICAL THERAPY | Facility: HOSPITAL | Age: 48
End: 2023-06-26

## 2023-06-26 ENCOUNTER — TELEPHONE (OUTPATIENT)
Dept: PHYSICAL THERAPY | Facility: HOSPITAL | Age: 48
End: 2023-06-26

## 2023-06-26 VITALS
HEART RATE: 110 BPM | OXYGEN SATURATION: 98 % | HEIGHT: 66 IN | WEIGHT: 159 LBS | BODY MASS INDEX: 25.55 KG/M2 | SYSTOLIC BLOOD PRESSURE: 130 MMHG | TEMPERATURE: 98 F | DIASTOLIC BLOOD PRESSURE: 70 MMHG

## 2023-06-26 DIAGNOSIS — B35.1 ONYCHOMYCOSIS: Primary | ICD-10-CM

## 2023-06-26 DIAGNOSIS — R10.2 PELVIC PAIN IN MALE: Primary | ICD-10-CM

## 2023-06-26 DIAGNOSIS — B35.6 TINEA CRURIS: ICD-10-CM

## 2023-06-26 PROCEDURE — 3008F BODY MASS INDEX DOCD: CPT

## 2023-06-26 PROCEDURE — 3078F DIAST BP <80 MM HG: CPT

## 2023-06-26 PROCEDURE — 3075F SYST BP GE 130 - 139MM HG: CPT

## 2023-06-26 PROCEDURE — 99213 OFFICE O/P EST LOW 20 MIN: CPT

## 2023-06-26 RX ORDER — TERBINAFINE HYDROCHLORIDE 250 MG/1
250 TABLET ORAL DAILY
Qty: 84 TABLET | Refills: 0 | Status: SHIPPED | OUTPATIENT
Start: 2023-06-26

## 2023-07-18 ENCOUNTER — OFFICE VISIT (OUTPATIENT)
Dept: FAMILY MEDICINE CLINIC | Facility: CLINIC | Age: 48
End: 2023-07-18

## 2023-07-18 VITALS
BODY MASS INDEX: 23.95 KG/M2 | SYSTOLIC BLOOD PRESSURE: 130 MMHG | HEIGHT: 68 IN | DIASTOLIC BLOOD PRESSURE: 80 MMHG | TEMPERATURE: 97 F | OXYGEN SATURATION: 96 % | WEIGHT: 158 LBS | HEART RATE: 108 BPM

## 2023-07-18 DIAGNOSIS — R21 RASH AND NONSPECIFIC SKIN ERUPTION: Primary | ICD-10-CM

## 2023-07-18 PROCEDURE — 3079F DIAST BP 80-89 MM HG: CPT

## 2023-07-18 PROCEDURE — 3075F SYST BP GE 130 - 139MM HG: CPT

## 2023-07-18 PROCEDURE — 3008F BODY MASS INDEX DOCD: CPT

## 2023-07-18 PROCEDURE — 99213 OFFICE O/P EST LOW 20 MIN: CPT

## 2023-07-18 RX ORDER — TRIAMCINOLONE ACETONIDE 1 MG/G
CREAM TOPICAL 2 TIMES DAILY PRN
Qty: 60 G | Refills: 0 | Status: SHIPPED | OUTPATIENT
Start: 2023-07-18

## 2023-08-07 ENCOUNTER — OFFICE VISIT (OUTPATIENT)
Dept: SURGERY | Facility: CLINIC | Age: 48
End: 2023-08-07

## 2023-08-07 DIAGNOSIS — R39.9 LOWER URINARY TRACT SYMPTOMS: Primary | ICD-10-CM

## 2023-08-07 PROCEDURE — 99204 OFFICE O/P NEW MOD 45 MIN: CPT | Performed by: UROLOGY

## 2023-08-07 NOTE — PROGRESS NOTES
Girma Manley MD  Department of Urology  [facility]  [facility] [address], [place]    T: [phone]  F: [phone]    To: Miri Kaplan [facility] [phone]    Re: Jenny Prado   MRN: [id number]  : 3/21/1975    Dear Diamond Pratt DO,    Today I had the pleasure of seeing Jenny Prado in my clinic. As you know, Mr. Alisia Ingram is a pleasant 51year old year old male who I am seeing for dysuria. Patient was last seen in this department on 2023. Briefly, patient has seen numerous urology providers in the past including Dr. Dary Stearns, Dr. Elane Duverney  at outside hospitals as well as our nurse practitioner Mitchell rodriguez in May 2023. He has a history of a bulbar urethral stricture which was dilated by Dr. Shelli Pimentel. He was given several other options in January however reportedly declined these procedures. Today he states that he has dysuria and frequency. At last visit he was recommended to return back to [facility]. Urine analysis in May 2023 was negative for infection.     PVR4       PAST MEDICAL HISTORY:  Past Medical History:   Diagnosis Date    Priapism     Scoliosis     Urethral stricture         PAST SURGICAL HISTORY:  Past Surgical History:   Procedure Laterality Date    CYSTOSCOPY,DIL URETHRAL STRICTURE  08    cystoscopy/UD    CYSTOSCOPY,DIL URETHRAL STRICTURE  14    Kaye    CYSTOURETHROSCOPY  12    cysto-Dr. Vicky Parsons    CYSTOURETHROSCOPY  13    cysto-Dr. Vicky Parsons    CYSTOURETHROSCOPY  16    cysto- Dr. Vicky Parsons    CYSTOURETHROSCOPY  2017    cysto - dr Sunday Ardon    CYSTOURETHROSCOPY  2020    outside cystoscopy, 16fr thin bulbar stricture (No UD mentioned)    NECK/CHEST PROCEDURE UNLISTED      Removal of a bone growth     SPINE FUSION,POSTER,- SGMTS      T2-T12 for thoracolumbar scoliosis          ALLERGIES:    Barium Sulfate          OTHER (SEE COMMENTS)  No Known Allergies      HIVES  Peanut (Diagnostic) HIVES  Ciprofloxacin           RASH  Penicillins             RASH    Comment:Pt states he can take Amoxicillin, but Penicillin             caused a rash  Doxycycline             NAUSEA ONLY  Gel Base                RASH    Comment:Gel used for ultrasound             Gel used for ultrasound      MEDICATIONS:  Current Outpatient Medications   Medication Instructions    terbinafine (LAMISIL) 250 mg, Oral, Daily    triamcinolone 0.1 % External Cream Topical, 2 times daily PRN        FAMILY HISTORY:  Family History   Problem Relation Age of Onset    Diabetes Father     Heart Disorder Father         CABG    Hypertension Father     Cancer Father         Lung    Heart Disorder Mother     Hypertension Mother     Heart Disease Maternal Grandmother     Heart Disease Maternal Grandfather     Stroke Paternal Grandmother     Heart Disease Paternal Grandmother     Stroke Paternal Grandfather     Heart Disease Paternal Grandfather     Colon Cancer Neg         SOCIAL HISTORY:  Social History     Socioeconomic History    Marital status: Single    Number of children: 0   Tobacco Use    Smoking status: Never    Smokeless tobacco: Never   Vaping Use    Vaping Use: Never used   Substance and Sexual Activity    Alcohol use: Yes     Alcohol/week: 3.0 standard drinks of alcohol     Types: 3 Cans of beer per week     Comment: social    Drug use: No    Sexual activity: Yes     Partners: Female   Other Topics Concern    Caffeine Concern No    Stress Concern No    Weight Concern No          PHYSICAL EXAMINATION:  There were no vitals filed for this visit.   CONSTITUTIONAL: No apparent distress, cooperative and communicative  NEUROLOGIC: Alert and oriented   HEAD: Normocephalic, atraumatic   EYES: Sclera non-icteric   ENT: Hearing intact, moist mucous membranes   NECK: No obvious goiter or masses   RESPIRATORY: Normal respiratory effort, Nonlabored breathing on room air  SKIN: No evident rashes   ABDOMEN: Soft, nontender, nondistended, no rebound tenderness, no guarding, no masses  GENITOURINARY: erection present during exam, made examination extremely difficult, no obvious masses on testicular exam althought it was a perfunctory exam    REVIEW OF SYSTEMS:    A comprehensive 10-point review of systems was completed. Pertinent positives and negatives are noted in the the HPI. LABORATORY DATA:  URINE CULTURE <10,000 cfu/ml Multiple species present- probable contamination. Resulting Agency: [facility])           IMAGING REVIEW:  none     OTHER RELEVANT DATA:   none     IMPRESSION: Lower urinary tract symptoms likely secondary to recurrent stricture-recommended patient follow-up with his primary urologist at [facility]. I did offer him a cystoscopy here. If he had stricture disease remaining we could consider a dilation or DVIU to see if his symptoms improve. He understands high chance of recurrence. PLAN:  Discuss evaluation/surgery with Dr. Stephen Helm as planned      Thank you for referring this very pleasant patient to my clinic. If you have any questions or concerns, please do not hesitate to contact me. Sincerely,  Yovana Chapa MD    30 minutes were spent on this patient at this visit obtaining a history, reviewing medical records, developing a treatment plan, counseling and discussing treatment strategy with patient, coordination of care and documentation. The [id number] makes medical notes available to patients in the interest of transparency. However, please be advised that this is a medical document. It is intended as a peer to peer communication. It is written in medical language and may contain abbreviations or verbiage that are technical and unfamiliar. It may appear blunt or direct. Medical documents are intended to carry relevant information, facts as evident, and the clinical opinion of the practitioner.

## 2023-10-27 ENCOUNTER — TELEPHONE (OUTPATIENT)
Dept: PHYSICAL THERAPY | Facility: HOSPITAL | Age: 48
End: 2023-10-27

## 2023-11-01 ENCOUNTER — OFFICE VISIT (OUTPATIENT)
Dept: PHYSICAL THERAPY | Age: 48
End: 2023-11-01
Attending: UROLOGY
Payer: COMMERCIAL

## 2023-11-01 DIAGNOSIS — R10.2 PELVIC PAIN IN MALE: Primary | ICD-10-CM

## 2023-11-01 PROCEDURE — 97162 PT EVAL MOD COMPLEX 30 MIN: CPT

## 2023-11-01 PROCEDURE — 97110 THERAPEUTIC EXERCISES: CPT

## 2023-11-01 PROCEDURE — 97140 MANUAL THERAPY 1/> REGIONS: CPT

## 2023-11-01 NOTE — PROGRESS NOTES
PELVIC FLOOR EVALUATION:   Referring Physician: Dr. Sue Hutchinson  Diagnosis: Pelvic pain in male (R10.2)   Date of onset: chronic Date of Service: 11/1/2023     PATIENT SUMMARY   Eyad Rome is a 51year old male  who presents to therapy today with complaints of groin pain  Current symptoms include: groin pain  History of condition: patient reports pelvic pain. He returns to the clinic after last being treated about 2 years prior. Pain decreased with treatment. Pain is on both side of the groin and down to the testicles. It does not go the perineum. Denies any urinary issues. Denies bowel issues. The pain increased last January. He has been trying to  manage it. Pain increased with prolonged sitting, standing, lying down. It is more random. It comes and goes. C/o occasional numbness and tingling. He doesn't sense the urine coming out. (Urge is normal). His low back has been  doing ok. Cold weather makes it stiff. Prostate History: WNL    Surgical history:see PMH  Urodynamic Test: no  Occupation/Activities: high school IT    Jd Peguero describes prior level of function limited due to LBP. Pt goals include reduce pain  Past medical history was reviewed with Jd Peguero. Significant findings include   Past Medical History:   Diagnosis Date    Priapism     Scoliosis     Urethral stricture         Precautions:  None    URINARY HABITS  Types of symptoms: none    BOWEL HABITS  Types of symptoms: normal    Frequency of bowel movements: daily  Stool consistency: [place] Stool Scale: 4  Do you strain with defecation: No   Laxative/Stool softener use: No      ASSESSMENT  Jd Peguero presents to physical therapy evaluation with primary c/o groin pain. Patient reports intermittent shooting pain starting in the groin and traveling to the testicles. He is unsure what causes pain. He denies any urinary or bowel issues. The results of the objective tests and measures show TTP in B groin.   Functional deficits include but are not limited to pain in groin . Signs and symptoms are consistent with diagnosis of pelvic pain. Pt and PT discussed evaluation findings, pathology, POC and HEP. Pt voiced understanding and performs HEP correctly without reported pain. Skilled Pelvic Physical Therapy is medically necessary to address the above impairments and reach functional goals. OBJECTIVE:   Range Of Motion  Lumbar AROM screen: dec in all directions  LE AROM screen: grossly WNL except: dec in hip IR    Strength (MMT) 5/5 BRETT LE except   Transverse Abdominis: 3+/5    Flexibility Summary: WNL BRETT LE except hamstrings, quads, hip flexors     Special Tests:wnl    Functional screen:  Double leg squat:wnl  Single leg squat:dec  Single leg stance: dec  Pelvic Floor Muscle strength: (PERF= Power/Endurance/Reps/Fast) MMT:  gross 3 sec hold  External Anal Sphincter: wfl  Accessory Muscle Use: gluteals      Today's Treatment and Response:   Patient education was provided on objective findings of external and internal evaluation and expectations with treatment outcomes.  Educated on pelvic anatomy and function with diagrams and pelvic model, bladder normatives, adequate hydration levels, proper toileting posture, and diaphragmatic breathing for PNS activation and pelvic floor relaxation     Patient was instructed in and issued a HEP for: hiip adductor stretch, diaphragmatic breathing    Charges: PT Eval Moderate Complexity, 1TE, 1MAN      Total Timed Treatment: 30 min     Total Treatment Time: 45 min     Based on clinical rationale and outcome measures, this evaluation involved Moderate Complexity decision making due to 1-2 personal factors/comorbidities, 3 body structures involved/activity limitations, and evolving symptoms including pelvic pain  PLAN OF CARE:    Goals: (to be met in 10 visits)  Patient instructed on bladder irritants, increased water intake to 64 ounces /day      Patient instructed on Levator Ani contraction inverse to diaphragmatic breathing to allow for pelvic brace with ADLs without valsalva. Patient exhibits an increase in pelvic floor strength fto 4/5  with 10 count hold to allow for pelvic brace with ADLs. Patient will report decreased groin pain from 3x per day to 0-1x per day    Patient understands importance of performing HEP to prevent reoccurrence of symptoms. Frequency / Duration: Patient will be seen for 1 x/week or a total of 10 visits over a 90 day period. Treatment will include: Manual Therapy, Neuromuscular Re-education, Self-Care Home Management, Therapeutic Exercise, and Home Exercise Program instruction     Education or treatment limitation: None  Rehab Potential:good      Patient/Family/Caregiver was advised of these findings, precautions, and treatment options and has agreed to actively participate in planning and for this course of care. Thank you for your referral. Please co-sign or sign and return this letter via fax as soon as possible to [phone]. If you have any questions, please contact me at Dept: [phone]    Sincerely,  Electronically signed by therapist: Ale Ayala PT  Physician's certification required: Yes  I certify the need for these services furnished under this plan of treatment and while under my care.     X___________________________________________________ Date____________________    Certification From: 27/1/3914  To:1/30/2024

## 2023-11-08 ENCOUNTER — OFFICE VISIT (OUTPATIENT)
Dept: PHYSICAL THERAPY | Age: 48
End: 2023-11-08
Attending: UROLOGY
Payer: COMMERCIAL

## 2023-11-08 PROCEDURE — 97140 MANUAL THERAPY 1/> REGIONS: CPT

## 2023-11-08 PROCEDURE — 97112 NEUROMUSCULAR REEDUCATION: CPT

## 2023-11-08 NOTE — PROGRESS NOTES
Dx:     Pelvic pain in male (R9.2)      Insurance (Authorized # of Visits):  [employer] PPO 10 per POC       Authorizing Physician: Dr. Georgina Christopher  Next MD visit: none scheduled  Fall Risk: standard         Precautions: n/a           Subjective: Getting pain when sleeping, walking, or when sitting. Objective: TTP in B Groin    Assessment: Patient presents with same symptoms. Discussed positioning to decrease pain. Focused on manual release to B groin. Added gentle stretching and strengthening. Will reassess on next session     Goals:   : (to be met in 10 visits)  Patient instructed on bladder irritants, increased water intake to 64 ounces /day        Patient instructed on Levator Ani contraction inverse to diaphragmatic breathing to allow for pelvic brace with ADLs without valsalva. Patient exhibits an increase in pelvic floor strength fto 4/5  with 10 count hold to allow for pelvic brace with ADLs. Patient will report decreased groin pain from 3x per day to 0-1x per day     Patient understands importance of performing HEP to prevent reoccurrence of symptoms. Plan: Reassess symptoms, continue with manual work and PF/core stabilization  Date: 11/8/2023  TX#: 2/10 Date:                 TX#: 3/ Date:                 TX#: 4/ Date:                 TX#: 5/ Date:    Tx#: 6/   TherEx:  LTR 20x  Adductor stretch [id number]       Manual: DTM to B groin       NeuroMuscular Estefany  Diaphragmatic breathing 20x        Self care Home management:       HEP: breathing, adductor stretch    Charges: 2MAN (30 min), TE (10 min)       Total Timed Treatment: 40 min  Total Treatment Time: 45 min

## 2023-11-15 ENCOUNTER — OFFICE VISIT (OUTPATIENT)
Dept: PHYSICAL THERAPY | Age: 48
End: 2023-11-15
Attending: UROLOGY
Payer: COMMERCIAL

## 2023-11-15 PROCEDURE — 97110 THERAPEUTIC EXERCISES: CPT

## 2023-11-15 PROCEDURE — 97140 MANUAL THERAPY 1/> REGIONS: CPT

## 2023-11-16 NOTE — PROGRESS NOTES
Dx:     Pelvic pain in male (R9.2)      Insurance (Authorized # of Visits):  Yoan Rivera 150 PPO 10 per POC       Authorizing Physician: Dr. Radha Clinton MD visit: none scheduled  Fall Risk: standard         Precautions: n/a           Subjective: Feeling about the same. Felt a little better after last session. Objective: TTP in B Groin    Assessment: Patient continues to c/o intermittent groin pain. Not able to correlate with any specific activity. Able to recreate pelvic pain with palpation to B groin L>R. Will reassess on next session. Goals:   : (to be met in 10 visits)  Patient instructed on bladder irritants, increased water intake to 64 ounces /day        Patient instructed on Levator Ani contraction inverse to diaphragmatic breathing to allow for pelvic brace with ADLs without valsalva. Patient exhibits an increase in pelvic floor strength fto 4/5  with 10 count hold to allow for pelvic brace with ADLs. Patient will report decreased groin pain from 3x per day to 0-1x per day     Patient understands importance of performing HEP to prevent reoccurrence of symptoms. Plan: Reassess symptoms, continue with manual work and PF/core stabilization  Date: 11/8/2023  TX#: 2/10 Date: 11/15/23               TX#: 3/10 Date:                 TX#: 4/ Date:                 TX#: 5/ Date:    Tx#: 6/   TherEx:  LTR 20x  Adductor stretch [id number] TherEx:  LTR 20x  Adductor stretch [id number]  Hooklying hip abd sqz 5 sec 20x      Manual: DTM to B groin Manual: DTM to B groin      NeuroMuscular Estefany  Diaphragmatic breathing 20x        Self care Home management:       HEP: breathing, adductor stretch    Charges: 2MAN (31 min), TE (10 min)       Total Timed Treatment: 41 min  Total Treatment Time: 42 min

## 2023-11-21 ENCOUNTER — OFFICE VISIT (OUTPATIENT)
Dept: PHYSICAL THERAPY | Age: 48
End: 2023-11-21
Attending: UROLOGY
Payer: COMMERCIAL

## 2023-11-21 PROCEDURE — 97110 THERAPEUTIC EXERCISES: CPT

## 2023-11-21 NOTE — PROGRESS NOTES
Discharge Summary  Pt has attended 4 visits in Physical Therapy. Dx:     Pelvic pain in male (R9.2)      Insurance (Authorized # of Visits):  Brinda Hinojosa PPO 10 per POC       Authorizing Physician: Dr. Thony Clinton MD visit: none scheduled  Fall Risk: standard         Precautions: n/a           Subjective:Feeling the same. Might feel a little better after manual work and exercises. No bowel or bladder issues. Objective: intermittent groin pain    Assessment: Patient has attended 4 sessions of therapy. Tx has consisted of manual work, strengthening, ROM, and stretching. He noted minimal relief in pain. No urinary or bowel issues noted. Recommend he follow up with physiatry. He will be DC from pelvic PT,       Goals:   : (to be met in 10 visits)  Patient instructed on bladder irritants, increased water intake to 64 ounces /day        Patient instructed on Levator Ani contraction inverse to diaphragmatic breathing to allow for pelvic brace with ADLs without valsalva. Patient exhibits an increase in pelvic floor strength fto 4/5  with 10 count hold to allow for pelvic brace with ADLs. Patient will report decreased groin pain from 3x per day to 0-1x per day     Patient understands importance of performing HEP to prevent reoccurrence of symptoms. Plan: Reassess symptoms, continue with manual work and PF/core stabilization  Date: 11/8/2023  TX#: 2/10 Date: 11/15/23               TX#: 3/10 Date: 11/21/23               TX#: 4/10 Date:                 TX#: 5/ Date: Tx#: 6/   TherEx:  LTR 20x  Adductor stretch [id number] TherEx:  LTR 20x  Adductor stretch [id number]  Hooklying hip abd sqz 5 sec 20x TE: review of POC, therex, stretching, strengthening.       Manual: DTM to B groin Manual: DTM to B groin      NeuroMuscular Estefany  Diaphragmatic breathing 20x        Self care Home management:       HEP: breathing, adductor stretch      Plan: DC to HEP    Patient/Family/Caregiver was advised of these findings, precautions, and treatment options and has agreed to actively participate in planning and for this course of care. Thank you for your referral. If you have any questions, please contact me at Dept: [phone].     Sincerely,  Electronically signed by therapist: Ovidio Cheema PT       Charges: 2TE(30 min)   Total Timed Treatment: 30 min  Total Treatment Time: 30 min

## 2023-11-22 ENCOUNTER — APPOINTMENT (OUTPATIENT)
Dept: PHYSICAL THERAPY | Age: 48
End: 2023-11-22
Attending: UROLOGY
Payer: COMMERCIAL

## 2023-11-29 ENCOUNTER — APPOINTMENT (OUTPATIENT)
Dept: PHYSICAL THERAPY | Age: 48
End: 2023-11-29
Attending: UROLOGY
Payer: COMMERCIAL

## 2023-12-06 ENCOUNTER — APPOINTMENT (OUTPATIENT)
Dept: PHYSICAL THERAPY | Age: 48
End: 2023-12-06
Attending: UROLOGY
Payer: COMMERCIAL

## 2023-12-11 ENCOUNTER — OFFICE VISIT (OUTPATIENT)
Facility: CLINIC | Age: 48
End: 2023-12-11
Payer: COMMERCIAL

## 2023-12-11 VITALS
SYSTOLIC BLOOD PRESSURE: 122 MMHG | HEIGHT: 68 IN | HEART RATE: 110 BPM | DIASTOLIC BLOOD PRESSURE: 78 MMHG | BODY MASS INDEX: 23.79 KG/M2 | WEIGHT: 157 LBS | OXYGEN SATURATION: 98 %

## 2023-12-11 DIAGNOSIS — J32.9 CHRONIC SINUSITIS, UNSPECIFIED LOCATION: Primary | ICD-10-CM

## 2023-12-11 DIAGNOSIS — J34.2 DEVIATED SEPTUM: ICD-10-CM

## 2023-12-11 PROCEDURE — 3074F SYST BP LT 130 MM HG: CPT | Performed by: FAMILY MEDICINE

## 2023-12-11 PROCEDURE — 3008F BODY MASS INDEX DOCD: CPT | Performed by: FAMILY MEDICINE

## 2023-12-11 PROCEDURE — 3078F DIAST BP <80 MM HG: CPT | Performed by: FAMILY MEDICINE

## 2023-12-11 PROCEDURE — 99214 OFFICE O/P EST MOD 30 MIN: CPT | Performed by: FAMILY MEDICINE

## 2023-12-11 RX ORDER — TRIAMCINOLONE ACETONIDE 55 UG/1
2 SPRAY, METERED NASAL DAILY
Qty: 1 EACH | Refills: 2 | Status: SHIPPED | OUTPATIENT
Start: 2023-12-11

## 2023-12-13 ENCOUNTER — PATIENT MESSAGE (OUTPATIENT)
Facility: CLINIC | Age: 48
End: 2023-12-13

## 2023-12-13 ENCOUNTER — APPOINTMENT (OUTPATIENT)
Dept: PHYSICAL THERAPY | Age: 48
End: 2023-12-13
Attending: UROLOGY
Payer: COMMERCIAL

## 2023-12-14 ENCOUNTER — PATIENT MESSAGE (OUTPATIENT)
Facility: CLINIC | Age: 48
End: 2023-12-14

## 2023-12-14 NOTE — TELEPHONE ENCOUNTER
From: Lucila Flaherty  To: Heather Salazar  Sent: 12/13/2023 12:25 PM CST  Subject: Med    Hi . Is there something I can take to get my cough congestion .     The spray is good for a few hours then I am cough alot    Please advise

## 2023-12-15 NOTE — TELEPHONE ENCOUNTER
Dr. Calista Silvestre pt stated that he has tried Mucinex and still has the cough and congestion. Pt will like to know if he should follow up with you on 12/20/23 or do you want him to go to see the ENT? Please advise.

## 2023-12-20 ENCOUNTER — APPOINTMENT (OUTPATIENT)
Dept: PHYSICAL THERAPY | Age: 48
End: 2023-12-20
Attending: UROLOGY
Payer: COMMERCIAL

## 2023-12-20 ENCOUNTER — OFFICE VISIT (OUTPATIENT)
Facility: CLINIC | Age: 48
End: 2023-12-20
Payer: COMMERCIAL

## 2023-12-20 ENCOUNTER — OFFICE VISIT (OUTPATIENT)
Dept: OTOLARYNGOLOGY | Facility: CLINIC | Age: 48
End: 2023-12-20
Payer: COMMERCIAL

## 2023-12-20 VITALS
OXYGEN SATURATION: 98 % | HEIGHT: 68 IN | SYSTOLIC BLOOD PRESSURE: 106 MMHG | BODY MASS INDEX: 23.34 KG/M2 | WEIGHT: 154 LBS | HEART RATE: 113 BPM | DIASTOLIC BLOOD PRESSURE: 60 MMHG

## 2023-12-20 DIAGNOSIS — Z00.00 PHYSICAL EXAM, ANNUAL: Primary | ICD-10-CM

## 2023-12-20 DIAGNOSIS — H65.02 NON-RECURRENT ACUTE SEROUS OTITIS MEDIA OF LEFT EAR: ICD-10-CM

## 2023-12-20 DIAGNOSIS — J01.01 ACUTE RECURRENT MAXILLARY SINUSITIS: ICD-10-CM

## 2023-12-20 DIAGNOSIS — H93.13 TINNITUS OF BOTH EARS: Primary | ICD-10-CM

## 2023-12-20 PROCEDURE — 3078F DIAST BP <80 MM HG: CPT | Performed by: FAMILY MEDICINE

## 2023-12-20 PROCEDURE — 99396 PREV VISIT EST AGE 40-64: CPT | Performed by: FAMILY MEDICINE

## 2023-12-20 PROCEDURE — 3074F SYST BP LT 130 MM HG: CPT | Performed by: FAMILY MEDICINE

## 2023-12-20 PROCEDURE — 3008F BODY MASS INDEX DOCD: CPT | Performed by: FAMILY MEDICINE

## 2023-12-20 RX ORDER — CEFDINIR 300 MG/1
300 CAPSULE ORAL 2 TIMES DAILY
Qty: 20 CAPSULE | Refills: 0 | Status: SHIPPED | OUTPATIENT
Start: 2023-12-20 | End: 2023-12-30

## 2023-12-20 RX ORDER — TADALAFIL 5 MG/1
5 TABLET ORAL DAILY
COMMUNITY
Start: 2023-10-30

## 2023-12-20 NOTE — PROGRESS NOTES
NEW PATIENT PROGRESS NOTE  OTOLOGY/OTOLARYNGOLOGY    REF MD:  No referring provider defined for this encounter. PCP: Charlotta Habermann, DO    CHIEF COMPLAINT:    Chief Complaint   Patient presents with    Sinus Problem     Pt reports sinus infection x 10 days, currently using nasal spray prn. Ringing in ears x 10 days       HISTORY OF PRESENT ILLNESS: Eyad Rome is a 51year old male who presents for evaluation of chronic sinusitis for one year. He also expresses concern for bilateral tinnitus that is worse in the left ear. Of note, patient is allergic to doxycyline, penicillin, and ciprofloxacin. Previously evaluated by PCP on 12/11/23 for sinus issues. Endorsed that symptoms began Friday (12/15) and included white rhinorrhea, sinus pressure, and tinnitus. Endorsed that Claritin, Sudafed, and Mucinex help mildly alleviate symptoms. Flonase, Afrin, and Azelastine made no improvement to symptoms. Mr. Juan Jose Mcmahon was diagnosed with chronic sinusitis although no bacterial infection. Prescribed trial of Nasacort, advised to follow up with ENT if no improvement.        PAST MEDICAL HISTORY:    Past Medical History:   Diagnosis Date    Priapism     Scoliosis     Urethral stricture        PAST SURGICAL HISTORY:    Past Surgical History:   Procedure Laterality Date    CYSTOSCOPY,DIL URETHRAL STRICTURE  9-24-08    cystoscopy/UD    CYSTOSCOPY,DIL URETHRAL STRICTURE  7/1/14    [facility]    CYSTOURETHROSCOPY  5-25-12    cysto-Dr. Marques Soliz    CYSTOURETHROSCOPY  11/22/13    cysto-Dr. Marques Soliz    CYSTOURETHROSCOPY  11/22/16    cystoJamey Soliz    CYSTOURETHROSCOPY  11/14/2017    cysto - dr Kaleb Black    CYSTOURETHROSCOPY  04/03/2020    outside cystoscopy, 16fr thin bulbar stricture (No UD mentioned)    NECK/CHEST PROCEDURE UNLISTED  2000    Removal of a bone growth     SPINE FUSION,POSTER,7-12 SGMTS  2004    T2-T12 for thoracolumbar scoliosis        Current Outpatient Medications on File Prior to Visit   Medication Sig Dispense Refill    tadalafil 5 MG Oral Tab Take 1 tablet (5 mg total) by mouth daily. triamcinolone 55 MCG/ACT Nasal Aerosol 2 sprays by Nasal route daily. 1 each 2     No current facility-administered medications on file prior to visit. Allergies: Allergies   Allergen Reactions    Barium Sulfate OTHER (SEE COMMENTS)    No Known Allergies HIVES    Peanut (Diagnostic) HIVES    Ciprofloxacin RASH    Penicillins RASH     Pt states he can take Amoxicillin, but Penicillin caused a rash    Doxycycline NAUSEA ONLY    Gel Base RASH     Gel used for ultrasound  Gel used for ultrasound       SOCIAL HISTORY:    Social History     Tobacco Use    Smoking status: Never    Smokeless tobacco: Never   Substance Use Topics    Alcohol use: Yes     Alcohol/week: 3.0 standard drinks of alcohol     Types: 3 Cans of beer per week     Comment: social       FAMILY HISTORY: Denies known family history of hearing loss, tinnitus, vertigo, or migraine. Denies known family history of head and neck cancer, thyroid cancer, bleeding disorders. REVIEW OF SYSTEMS:   Positives are in bold  Neuro: Headache, facial weakness, facial numbness, neck pain, vertigo  ENT: Hearing change, tinnitus, otorrhea, otalgia, aural fullness, ear pressure, vertigo, imbalance  Sinus pressure, rhinorrhea, congestion, facial pain, jaw pain, dysphagia, odynophagia, sore throat, voice changes, shortness of breath    EXAMINATION:  I washed my hands with an alcohol-based hand gel prior to examination  Constitutional:   --Vitals: There were no vitals taken for this visit. General: no apparent distress, well-developed, conversant  Psych: affect pleasant and appropriate for age, alert and oriented  Respiratory: No stridor, stertor or increased work of breathing  ENT:  --Nose: no external nasal deformity, anterior rhinoscopy: Septum midline, no inferior turbinate hypertrophy, mucosa healthy, no rhinorrhea  --OC/OP: No trismus.  No masses or lesions noted over the gingiva, buccal mucosa, tongue, FOM, hard/soft palate, tonsillar pillars, posterior pharyngeal wall. Tonsils are 1+ and soft. FOM/BOT are soft. --Neck: No palpable cervical lymphadenopathy, no thyromegaly, no masses or lesions over the bilateral submandibular or parotid glands  --Ear: (bilateral ears were examined under binocular microscopy)  Right ear microscopic exam:  Pinna: Normal, no lesions or masses. Mastoid: Nontender on palpation. External auditory canal: Clear, no masses or lesions. Tympanic membrane: Intact, no lesions, normal landmarks. Middle ear: Aerated. Left ear microscopic exam:  Pinna: Normal, no lesions or masses. Mastoid: Nontender on palpation. External auditory canal: Clear, no masses or lesions. Tympanic membrane: Intact, no lesions, normal landmarks. Middle ear: Serous otitis media    Nasal endoscopy (date 12/20/23)  Verbal consent obtained, patient was correctly identified  Topical anesthesia with aerosolized lidocaine and neosynepherine spray in the bilateral nares  Findings:   Right: No mucous throughout. Normal mucosa. Inferior turbinate is non-hypertrophic. Middle meatus is without polyps, purulence, masses. Middle turbinate is well formed and normal appearing. Sphenoethmoid recess is without polyps, purulence, masses. Left: No mucous throughout. Normal mucosa. Inferior turbinate is non-hypertrophic. Purulence draining from middle meatus. Middle meatus is without polyps or masses. Middle turbinate is well formed and normal appearing. Sphenoethmoid recess is without polyps, purulence, masses. Septum: largely midline  Nasopharynx: No masses, bilateral eustachian tubes are patent. The bilateral fossae of Rosenmueller are clear. ASSESSMENT/PLAN:  Ivan Willson is a 51year old male with     ICD-10-CM   1. Tinnitus of both ears  H93.13   2. Acute recurrent maxillary sinusitis  J01.01   3.  Non-recurrent acute serous otitis media of left ear  H65.02        IMPRESSION:  Left acute maxillary sinusitis  Left acute serous otiitis media    PLAN:  -Start Cefdinir 300mg BID for 10 days  -Discussed that there is a small cross sensitivity with penicillin allergy but patient is OK with observing for allergy symptoms   -Recommend nasal irrigations, proper procedure was reviewed by nurse  -OK to use decongestants as supportive meausres  -Follow up in 2 weeks for ear check and to monitor resolution of infection     Situation reviewed with the patient in detail. Attention: This note has been scribed by Mariposa Jerez under the supervision of Perez Zavala MD.    Perez Zavala MD  Otology/Otolaryngology  [facility]   [address] [address] [facility]  Phone [phone]  Fax [phone]     I have personally performed the services described in this documentation. All medical record entries made by the scribe were at my direction and in my presence. I have reviewed the chart and agree that the medical record reflects my personal performance and is accurate and complete.

## 2023-12-21 ENCOUNTER — MED REC SCAN ONLY (OUTPATIENT)
Facility: CLINIC | Age: 48
End: 2023-12-21

## 2023-12-27 ENCOUNTER — APPOINTMENT (OUTPATIENT)
Dept: PHYSICAL THERAPY | Age: 48
End: 2023-12-27
Attending: UROLOGY
Payer: COMMERCIAL

## 2024-01-10 ENCOUNTER — APPOINTMENT (OUTPATIENT)
Dept: PHYSICAL THERAPY | Age: 49
End: 2024-01-10
Attending: UROLOGY
Payer: COMMERCIAL

## 2024-01-23 ENCOUNTER — OFFICE VISIT (OUTPATIENT)
Dept: OTOLARYNGOLOGY | Facility: CLINIC | Age: 49
End: 2024-01-23
Payer: COMMERCIAL

## 2024-01-23 VITALS — HEIGHT: 68 IN | WEIGHT: 154 LBS | BODY MASS INDEX: 23.34 KG/M2

## 2024-01-23 DIAGNOSIS — J30.9 CHRONIC ALLERGIC RHINITIS: Primary | ICD-10-CM

## 2024-01-23 PROCEDURE — 99213 OFFICE O/P EST LOW 20 MIN: CPT | Performed by: STUDENT IN AN ORGANIZED HEALTH CARE EDUCATION/TRAINING PROGRAM

## 2024-01-23 PROCEDURE — 3008F BODY MASS INDEX DOCD: CPT | Performed by: STUDENT IN AN ORGANIZED HEALTH CARE EDUCATION/TRAINING PROGRAM

## 2024-01-23 PROCEDURE — 31231 NASAL ENDOSCOPY DX: CPT | Performed by: STUDENT IN AN ORGANIZED HEALTH CARE EDUCATION/TRAINING PROGRAM

## 2024-01-24 NOTE — PROGRESS NOTES
Herson Moya is a 48 year old male.   Chief Complaint   Patient presents with    Sinus Problem     Sinus pressure and congestion       ASSESSMENT AND PLAN:   1. Chronic allergic rhinitis  48-year-old seen in follow-up regarding possible sinusitis seen by Dr. Walters on December 20.  He was started on cefdinir.  He had multiple GI side effects from the antibiotic.  Of note he had a normal CT scan of his sinuses on October 29.    Exam with a significant right-sided nasal deviation although he denies any nasal obstruction.  On the left I did not see any purulence as previously noted from the middle meatus.    Appears that his sinusitis is resolved I do not see any purulence on the exam and his symptoms are improving after the cefdinir course.  Residual symptoms that he is having may be allergy related.  He says that Sudafed does help although he should not take this on a long-term basis.  I gave him a handout regarding saline irrigations and also discussed steam inhalation as other treatment alternatives.  Discussed over-the-counter allergy medications he can see me back as needed.      The patient indicates understanding of these issues and agrees to the plan.      EXAM:   Ht 5' 8\" (1.727 m)   Wt 154 lb (69.9 kg)   BMI 23.42 kg/m²     Pertinent exam findings may also be noted above in assessment and plan     System Details   Skin Inspection - Normal.   Constitutional Overall appearance - Normal.   Head/Face Symmetric, TMJ tenderness not present    Eyes EOMI, PERRL   Right ear:  Canal clear, TM intact, no GABRIELLE   Left ear:  Canal clear, TM intact, no GABRIELLE   Nose: Septum midline, inferior turbinates not enlarged, nasal valves without collapse    Oral cavity/Oropharynx: No lesions or masses on inspection or palpation, tonsils symmetric    Neck: Soft without LAD, thyroid not enlarged  Voice clear/ no stridor   Other:      Scopes and Procedures:     Nasal Endoscopy Procedure Note     Due to inability for adequate examination  of the nose and nasopharynx and need for magnification to perform the examination, endoscopy was performed.  Risks and benefits were discussed with patient/family and they have given verbal consent to proceed.    Pre-operative Diagnosis:   1. Chronic allergic rhinitis        Post-operative Diagnosis: Same    Procedure: Diagnostic nasal endoscopy    Anesthesia: Topical anesthetic [place]     Surgeon Jhon Roblero MD    EBL: 0cc    Procedure Detail & Findings:     After placement of topical anesthetic intranasally the endoscope was inserted into each nares and driven through the nasal cavity into the nasopharynx. The following findings were noted:    Septum: Deviation to the right  Inferior turbinates: Normal  Middle meatus: Patent  Middle turbinates: Normal  Purulence: None noted  Polyps: None noted  Nasopharynx and eustachian tube: No masses  Other: The middle and superior meatus, the turbinates, and the spheno-ethmoid recess were inspected and seen to be without significant abnormal findings.     Condition: Stable    Complications: Patient tolerated the procedure well with no immediate complication.    Jhon Roblero MD        Current Outpatient Medications   Medication Sig Dispense Refill    tadalafil 5 MG Oral Tab Take 1 tablet (5 mg total) by mouth daily.      triamcinolone 55 MCG/ACT Nasal Aerosol 2 sprays by Nasal route daily. 1 each 2      Past Medical History:   Diagnosis Date    Priapism     Scoliosis     Urethral stricture       Social History:  Social History     Socioeconomic History    Marital status: Single    Number of children: 0   Tobacco Use    Smoking status: Never    Smokeless tobacco: Never   Vaping Use    Vaping Use: Never used   Substance and Sexual Activity    Alcohol use: Yes     Alcohol/week: 3.0 standard drinks of alcohol     Types: 3 Cans of beer per week     Comment: social    Drug use: No    Sexual activity: Yes     Partners: Female   Other Topics Concern    Caffeine Concern No    Stress  Concern No    Weight Concern No          Jhon Roblero MD  1/23/2024  6:42 PM

## 2024-03-13 ENCOUNTER — OFFICE VISIT (OUTPATIENT)
Dept: INTERNAL MEDICINE CLINIC | Facility: CLINIC | Age: 49
End: 2024-03-13

## 2024-03-13 VITALS
DIASTOLIC BLOOD PRESSURE: 78 MMHG | WEIGHT: 155.63 LBS | HEIGHT: 68 IN | BODY MASS INDEX: 23.59 KG/M2 | HEART RATE: 98 BPM | SYSTOLIC BLOOD PRESSURE: 117 MMHG

## 2024-03-13 DIAGNOSIS — R21 RASH: Primary | ICD-10-CM

## 2024-03-13 PROCEDURE — 3074F SYST BP LT 130 MM HG: CPT | Performed by: INTERNAL MEDICINE

## 2024-03-13 PROCEDURE — 99213 OFFICE O/P EST LOW 20 MIN: CPT | Performed by: INTERNAL MEDICINE

## 2024-03-13 PROCEDURE — 3008F BODY MASS INDEX DOCD: CPT | Performed by: INTERNAL MEDICINE

## 2024-03-13 PROCEDURE — 3078F DIAST BP <80 MM HG: CPT | Performed by: INTERNAL MEDICINE

## 2024-03-13 NOTE — PROGRESS NOTES
Herson Moya is a 48 year old male.   Chief Complaint   Patient presents with    Rash     HPI:   Herson presents this afternoon for evaluation of a rash    Yesterday he was seated on a friend's couch when he stood up and noticed an itchy rash on his back and chest.  No associated pain.  No fever.  He thought the rash might be \"folliculitis,\" as he noticed multiple \"red zits.\"  He applied CeraVe moisturizing cream a few times and today the rash seems significantly better.  In fact when he examines himself now he does not notice a rash.  No recent exposures.  He has not recently changed soap or laundry detergent.  Current Outpatient Medications   Medication Sig Dispense Refill    tadalafil 5 MG Oral Tab Take 1 tablet (5 mg total) by mouth daily.       Allergies   Allergen Reactions    Barium Sulfate OTHER (SEE COMMENTS)    No Known Allergies HIVES    Peanut (Diagnostic) HIVES    Ciprofloxacin RASH    Penicillins RASH     Pt states he can take Amoxicillin, but Penicillin caused a rash    Doxycycline NAUSEA ONLY    Gel Base RASH     Gel used for ultrasound  Gel used for ultrasound      Past Medical History:   Diagnosis Date    Priapism     Scoliosis     Urethral stricture      Past Surgical History:   Procedure Laterality Date    CYSTOSCOPY,DIL URETHRAL STRICTURE  9-24-08    cystoscopy/UD    CYSTOSCOPY,DIL URETHRAL STRICTURE  7/1/14    Kaye    CYSTOURETHROSCOPY  5-25-12    cysto-Dr. Hernandez    CYSTOURETHROSCOPY  11/22/13    cystoLatesha Hernandez    CYSTOURETHROSCOPY  11/22/16    cystoJamey Hernandez    CYSTOURETHROSCOPY  11/14/2017    cysto Jamey hernandez    CYSTOURETHROSCOPY  04/03/2020    outside cystoscopy, 16fr thin bulbar stricture (No UD mentioned)    NECK/CHEST PROCEDURE UNLISTED  2000    Removal of a bone growth     SPINE FUSION,POSTER,7-12 SGMTS  2004    T2-T12 for thoracolumbar scoliosis       Social History:  Social History     Socioeconomic History    Marital status: Single    Number of children: 0   Tobacco Use    Smoking status:  Never    Smokeless tobacco: Never   Vaping Use    Vaping Use: Never used   Substance and Sexual Activity    Alcohol use: Yes     Alcohol/week: 3.0 standard drinks of alcohol     Types: 3 Cans of beer per week     Comment: social    Drug use: No    Sexual activity: Yes     Partners: Female   Other Topics Concern    Caffeine Concern No    Stress Concern No    Weight Concern No        EXAM:   GENERAL: Pleasant male appearing well in no distress  /78 (BP Location: Left arm, Patient Position: Sitting, Cuff Size: adult)   Pulse 98   Ht 5' 8\" (1.727 m)   Wt 155 lb 9.6 oz (70.6 kg)   BMI 23.66 kg/m²   SKIN: Multiple small scattered cherry angiomas chest wall and back.  No papular pustular vesicular or urticarial lesions seen    ASSESSMENT AND PLAN:   1. Rash  Now resolved.  Atypical for folliculitis  Close observation  Return if rash recurs      The patient indicates understanding of these issues and agrees to the plan.  The patient is asked to return as needed.    Ish Green MD  3/13/2024  3:37 PM

## 2024-05-14 ENCOUNTER — TELEPHONE (OUTPATIENT)
Dept: SURGERY | Facility: CLINIC | Age: 49
End: 2024-05-14

## 2024-05-14 NOTE — TELEPHONE ENCOUNTER
-S/w pt; identity verified with name & .  -Pt had Consult OV today w/ MD.  -Pt LOV w/ [facility] on 24.   -He states \"Dr. Restrepo/ [facility] told me to see Roxanna since I saw her in the past ().\"  -Pt plans to call Dr. Restrepo's office to be put on the waitlist.  -Encounter complete.     [address] [place] [address] [address], [address], [place]  Phone:  [phone]  LPU:[phone]       Name: Roddy Fleming  : 1992    Chief Complaint   Patient presents with    Ankle Pain     right ankle pain     all started 5 months ago    questioning thyroid  wants labs done        HPI:     Roddy Fleming is a 28 y.o. female who presents today for evaluation of right ankle pain. It first started in March when she was walking outside in the morning. It was dark and she slipped off the sidewalk, inverting her right ankle. It took her to the ground and she was unable to move for about 5 minutes. She rested the ankle and used crutches for a few weeks, but the ankle never fully healed and it's still painful on lateral and medial malleoli. She has a history of right ankle fracture when she was 14 yo and has had multiple ankle sprains. She has been struggling with her weight despite eating healthy. She did a 21 day diet and gained weight. She's tired a lot. She recently found out her mom and sister have hypothyroidism. Current Outpatient Medications:     Loratadine (CLARITIN PO), Take by mouth daily, Disp: , Rfl:     famotidine (PEPCID) 40 MG tablet, Take 40 mg by mouth daily, Disp: , Rfl:     ALBUTEROL IN, Inhale into the lungs, Disp: , Rfl:     No Known Allergies    Subjective:      Review of Systems   Constitutional: Positive for fatigue. Negative for chills and fever. HENT: Negative for congestion and rhinorrhea. Eyes: Negative for discharge and redness. Respiratory: Negative for cough and shortness of breath. Gastrointestinal: Negative for nausea and vomiting. Musculoskeletal: Positive for arthralgias. Skin: Negative for color change and rash. Neurological: Negative for dizziness and headaches. Hematological: Negative for adenopathy. Does not bruise/bleed easily. Psychiatric/Behavioral: Negative for dysphoric mood. The patient is not nervous/anxious. Objective:     /64 (Site: Left Upper Arm, Position: Sitting, Cuff Size: Large Adult)   Temp 96.8 °F (36 °C)   Ht 5' 8\" (1.727 m)   Wt 244 lb (110.7 kg)   BMI 37.10 kg/m²     Physical Exam  Vitals signs and nursing note reviewed. Constitutional:       General: She is not in acute distress. Appearance: She is well-developed. HENT:      Head: Normocephalic and atraumatic. Nose: Nose normal.   Eyes:      Conjunctiva/sclera: Conjunctivae normal.   Neck:      Musculoskeletal: Normal range of motion and neck supple. Cardiovascular:      Rate and Rhythm: Normal rate and regular rhythm. Heart sounds: Normal heart sounds. Pulmonary:      Effort: Pulmonary effort is normal. No respiratory distress. Breath sounds: Normal breath sounds. No wheezing. Musculoskeletal:      Right ankle: She exhibits decreased range of motion (decreased eversion). She exhibits no swelling, no ecchymosis and no deformity. Tenderness. Lateral malleolus and medial malleolus tenderness found. Skin:     General: Skin is warm and dry. Findings: No erythema or rash. Neurological:      Mental Status: She is alert and oriented to person, place, and time. Psychiatric:         Behavior: Behavior normal.       Assessment/Plan:     Davon Franco was seen today for ankle pain. Diagnoses and all orders for this visit:    Chronic pain of right ankle  -     Her ankle pain has not improved in the past 5 months so will check x-rays for further evaluation. -     XR ANKLE RIGHT (MIN 3 VIEWS); Future    Chronic fatigue  -     TSH without Reflex; Future  -     Vitamin D 25 Hydroxy; Future  -     Vitamin B12 & Folate; Future  -     CBC With Auto Differential; Future      Return if symptoms worsen or fail to improve.     Electronically signed by Kimberly Sánchez MD on 8/7/2020 at 8:34 AM

## 2024-06-05 ENCOUNTER — OFFICE VISIT (OUTPATIENT)
Dept: SURGERY | Facility: CLINIC | Age: 49
End: 2024-06-05

## 2024-06-05 DIAGNOSIS — R39.9 LOWER URINARY TRACT SYMPTOMS: Primary | ICD-10-CM

## 2024-06-05 PROCEDURE — 99214 OFFICE O/P EST MOD 30 MIN: CPT | Performed by: UROLOGY

## 2024-06-05 RX ORDER — TAMSULOSIN HYDROCHLORIDE 0.4 MG/1
0.4 CAPSULE ORAL DAILY
Qty: 90 CAPSULE | Refills: 3 | Status: SHIPPED | OUTPATIENT
Start: 2024-06-05 | End: 2025-05-31

## 2024-06-05 NOTE — PROGRESS NOTES
Leydi Jules MD  Department of Urology  [address] [place] [address]., [address]  [place] [address]    T: [phone]  F: [phone]    To: Carol Raymond DO   [address] [facility] [address]  [facility] [address]    Re: Herson Moya   MRN: [id number]  : 3/21/1975    Dear Carol Raymond DO,    Today I had the pleasure of seeing Herson Moya in my clinic. As you know, Mr. Moya is a pleasant 49 year old year old male who I am seeing for multiple complaints. Patient was last seen in this department on 2023.    Briefly, please note that he recently saw a urologist in 2024.  He has seen numerous only female urologists all for primary concern drainage from testicular pain, dysuria and erectile dysfunction.  At his last visit in 2024 he reported dysuria 2-3 times per day, he had bilateral testicular discomfort.  Plan at that visit was to discuss STI testing, consider cystoscopy.  She appropriately recommended he follow-up only with 1 urologist however he has once again made an appointment with me.  He was prescribed alfuzosin 10 mg extended release.  He has also been prescribed physical therapy.    Please note that I had seen him once prior.  I had seen him in 2023 during which he reported a history of bulbar urethral stricture.  We had recommended a cystoscopy.  His PVR at that visit was 4.  I recommended a referral to Dr. Ordoñez for consideration of urethral stricture disease evaluation given his reported history.    Today he reports the same symptoms that he reported to Dr. Reza on 2024.  I reviewed her note in detail and agree with the recommendations which I stated to him.    He reports that he has done physical therapy for 2 months he stopped alfuzosin after 2 months as it was causing painful sex.  He was told by the physical therapist not to come back in to see a urologist.       PAST MEDICAL HISTORY:  Past Medical History:    Priapism    Scoliosis    Urethral stricture         PAST SURGICAL HISTORY:  Past Surgical History:   Procedure Laterality Date    Cystoscopy,dil urethral stricture  9-24-08    cystoscopy/UD    Cystoscopy,dil urethral stricture  7/1/14    Kaye    Cystourethroscopy  5-25-12    cystCharu Hernandez    Cystourethroscopy  11/22/13    cystoLatesha Hernandez    Cystourethroscopy  11/22/16    normanoJamey Hernandez    Cystourethroscopy  11/14/2017    cysto Jamey hernandez    Cystourethroscopy  04/03/2020    outside cystoscopy, 16fr thin bulbar stricture (No UD mentioned)    Neck/chest procedure unlisted  2000    Removal of a bone growth     Spine fusion,poster,7-12 sgmts  2004    T2-T12 for thoracolumbar scoliosis          ALLERGIES:  Allergies   Allergen Reactions    Barium Sulfate OTHER (SEE COMMENTS)    No Known Allergies HIVES    Peanut (Diagnostic) HIVES    Ciprofloxacin RASH    Penicillins RASH     Pt states he can take Amoxicillin, but Penicillin caused a rash    Doxycycline NAUSEA ONLY    Gel Base RASH     Gel used for ultrasound  Gel used for ultrasound         MEDICATIONS:  Current Outpatient Medications   Medication Instructions    tadalafil (CIALIS) 5 mg, Oral, Daily        FAMILY HISTORY:  Family History   Problem Relation Age of Onset    Diabetes Father     Heart Disorder Father         CABG    Hypertension Father     Cancer Father         Lung    Heart Disorder Mother     Hypertension Mother     Heart Disease Maternal Grandmother     Heart Disease Maternal Grandfather     Stroke Paternal Grandmother     Heart Disease Paternal Grandmother     Stroke Paternal Grandfather     Heart Disease Paternal Grandfather     Colon Cancer Neg         SOCIAL HISTORY:  Social History     Socioeconomic History    Marital status: Single    Number of children: 0   Tobacco Use    Smoking status: Never    Smokeless tobacco: Never   Vaping Use    Vaping status: Never Used   Substance and Sexual Activity    Alcohol use: Yes     Alcohol/week: 3.0 standard drinks of alcohol     Types: 3 Cans of beer per week      Comment: social    Drug use: No    Sexual activity: Yes     Partners: Female   Other Topics Concern    Caffeine Concern No    Stress Concern No    Weight Concern No     Social Determinants of Health      Received from [facility]    Social Connections    Received from [facility]    Housing Stability          PHYSICAL EXAMINATION:  There were no vitals filed for this visit.  CONSTITUTIONAL: No apparent distress, cooperative and communicative  NEUROLOGIC: Alert and oriented   HEAD: Normocephalic, atraumatic   EYES: Sclera non-icteric   ENT: Hearing intact, moist mucous membranes   NECK: No obvious goiter or masses   RESPIRATORY: Normal respiratory effort, Nonlabored breathing on room air  SKIN: No evident rashes   ABDOMEN: Soft, nontender, nondistended, no rebound tenderness, no guarding, no masses    REVIEW OF SYSTEMS:    A comprehensive 10-point review of systems was completed.  Pertinent positives and negatives are noted in the the HPI.       LABORATORY DATA:  Component  Ref Range & Units 2/6/24  4:11 PM   Color, Urine Light Yellow   Clarity, Urine Clear   Specific Gravity, Urine  1.005 - 1.035 1.026   pH, Urine  5.0 - 7.0 7.0   Protein, UA  Negative, 10-20 mg/dL 10   Glucose, Urine  Normal, Negative mg/dL Normal   Ketones, Urine  Negative mg/dL Negative   Bilirubin, Urine  Negative Negative   Blood, Urine  Negative Negative   Nitrite, Urine  Negative Negative   Leukocyte Esterase, Urine  Negative Richardson/uL Negative   Urobilinogen, Urine  Normal, <2.0 mg/dL Normal   RBC, Urine  None, 0-2 /hpf 0-2   WBC, Urine  None, 0-5 /hpf 0-5   Squamous Epithelial Cells, Urine  None /hpf Few Abnormal    Bacteria, Urine  None /hpf Trace Abnormal    Hyaline Cast, Urine  None, 0-2 /lpf None   Mucus, Urine  None, Trace, Few /hpf Few     Chlamydia trachomatis, PCR  Negative Negative   Neisseria gonorrhoeae, PCR  Negative Negative        URINE CULTURE <10,000 cfu/ml Multiple species present-  probable contamination.              IMAGING REVIEW:  Narrative   PROCEDURE: XR HIP W OR WO PELVIS 2 OR 3 VIEWS, RIGHT (CPT=73502)     COMPARISON: [facility], CT PF RENAL STONE ABD/P WO CON, 9/30/2013, 5:51 PM.     INDICATIONS: Bilateral hip pain ongoing for several months.     TECHNIQUE: 1 AP view of the pelvis was obtained. Additional frontal and frog-leg lateral radiographs of the right hip were acquired.       FINDINGS:  BONES: No acute fracture or dislocation is evident. There is partial delineation of a right iliac exostosis. Chronic-appearing deformities of the femoral necks are redemonstrated, not significantly changed. Slight uncovering of the right femoral head is  suggested. The iliopectineal and ilioischial lines are uninterrupted. The arcuate lines, insofar as seen, are intact. No suspicious osseous lesions are detected. The joint spaces are preserved without evidence of significant arthropathy.  SOFT TISSUES: Negative for visible soft tissue swelling or radiopaque foreign body.    EFFUSION: None visible.                    Impression   CONCLUSION:  1. Chronic-appearing deformities of the femoral necks bilaterally, perhaps developmental or indicative of underlying osseous lesions. Consider follow-up MRI to ascertain contiguity of the respective intramedullary spaces and cortex.     2. Partially visualized stable right iliac exostosis. Correlation for potential hereditary multiple osteochondromatosis is advised.           elm-remote.        Dictated by (CST): Jose Rios MD on 11/30/2022 at 0:24 AM      Finalized by (CST): Jose Rios MD on 11/30/2022 at 0:27 AM              OTHER RELEVANT DATA:   none     IMPRESSION: Pelvic discomfort/pain and patient was seen numerous only female urologists.  I agree with the recommendations from his most recent visit with a urologist at [facility].  I reiterated the same recommendations and offered him the same tests that have been offered  previously.  I emphasized the importance of following up with 1 provider to avoid multiple variant plans.      At this time I offered a urine analysis which he declined.  It is reassuring that his most recent urine analysis and GC/CT testing from February 2024 was negative.  It is also reassuring that our PVR from August 2024 was 4.      I discussed behavioral management in detail.  I mentioned that it can take anywhere from 3 to 6 months to really have an effect sometimes to have an effect.  Behavioral management includes timed voiding, double voiding, avoiding bladder irritants (coffee, tea, soda, alcohol), sitz baths, meditation 10 minutes every day, constipation avoidance, voiding prior to bedtime, avoiding fluids 2 to 4 hours before bedtime, compression stockings, scrotal elevation, scrotal rest, NSAIDs for any testicular discomfort, ice.    We also discussed continuing alfuzosin or switching to tamsulosin.  He opted to switch to tamsulosin.Tamsulosin will help relax the pelvic floor muscles.  It should be taken at night as it can cause some dizziness.  It also can cause decreased ejaculate volume and should be stopped if he is trying to conceive.    He should continue pelvic floor physical therapy-not strengthening exercises but relaxation exercises.  As mentioned at prior visits, can also consider diagnostic testing with cystoscopy and transrectal ultrasound, possible urodynamics.  Rectal suppositories with Valium and baclofen may be helpful in the future.     PLAN:  Behavioral management  Tamsulosin 0.4 mg nightly  Pelvic floor physical therapy    Thank you for referring this very pleasant patient to my clinic. If you have any questions or concerns, please do not hesitate to contact me.    Sincerely,  Leydi Jules MD    30 minutes were spent on this patient at this visit obtaining a history, reviewing medical records, developing a treatment plan, counseling and discussing treatment strategy with  patient, coordination of care and documentation.     The 21st Century Cures Act makes medical notes available to patients in the interest of transparency.  However, please be advised that this is a medical document.  It is intended as a peer to peer communication.  It is written in medical language and may contain abbreviations or verbiage that are technical and unfamiliar.  It may appear blunt or direct.  Medical documents are intended to carry relevant information, facts as evident, and the clinical opinion of the practitioner.

## 2024-07-08 ENCOUNTER — OFFICE VISIT (OUTPATIENT)
Dept: INTERNAL MEDICINE CLINIC | Facility: CLINIC | Age: 49
End: 2024-07-08

## 2024-07-08 VITALS
BODY MASS INDEX: 24.25 KG/M2 | HEIGHT: 68 IN | OXYGEN SATURATION: 99 % | DIASTOLIC BLOOD PRESSURE: 77 MMHG | WEIGHT: 160 LBS | SYSTOLIC BLOOD PRESSURE: 123 MMHG | HEART RATE: 83 BPM

## 2024-07-08 DIAGNOSIS — H92.03 OTALGIA OF BOTH EARS: Primary | ICD-10-CM

## 2024-07-08 PROCEDURE — 3074F SYST BP LT 130 MM HG: CPT

## 2024-07-08 PROCEDURE — 99213 OFFICE O/P EST LOW 20 MIN: CPT

## 2024-07-08 PROCEDURE — 3078F DIAST BP <80 MM HG: CPT

## 2024-07-08 PROCEDURE — 3008F BODY MASS INDEX DOCD: CPT

## 2024-07-08 NOTE — PROGRESS NOTES
Subjective:   Herson Moya is a 49 year old male who presents for Ear Pain     C/c bilateral ear pain since Saturday, worse on the left ear   Describes as a burning pain that comes and goes   No cough, fever, chills, runny nose   Hearing is not impacted and no tinnitus   No recent airplane travel or swimming   Has sinus issues sometimes but denies environmental allergies      History/Other:    Chief Complaint Reviewed and Verified  No Further Nursing Notes to   Review  Tobacco Reviewed  Allergies Reviewed  Medications Reviewed         Tobacco:  He has never smoked tobacco.    Current Outpatient Medications   Medication Sig Dispense Refill    tamsulosin 0.4 MG Oral Cap Take 1 capsule (0.4 mg total) by mouth daily. Take 1/2 hour following the same meal each day 90 capsule 3    tadalafil 5 MG Oral Tab Take 1 tablet (5 mg total) by mouth daily.       Review of Systems:  Review of Systems  10 point review of systems otherwise negative with the exception of HPI and assessment and plan    Objective:   /77   Pulse 83   Ht 5' 8\" (1.727 m)   Wt 160 lb (72.6 kg)   SpO2 99%   BMI 24.33 kg/m²  Estimated body mass index is 24.33 kg/m² as calculated from the following:    Height as of this encounter: 5' 8\" (1.727 m).    Weight as of this encounter: 160 lb (72.6 kg).  Physical Exam  Vitals reviewed.   Constitutional:       General: He is not in acute distress.     Appearance: Normal appearance. He is well-developed.   HENT:      Right Ear: Tympanic membrane, ear canal and external ear normal.      Left Ear: Tympanic membrane, ear canal and external ear normal.      Mouth/Throat:      Mouth: Mucous membranes are moist.      Pharynx: Oropharynx is clear.   Cardiovascular:      Rate and Rhythm: Normal rate.   Pulmonary:      Effort: Pulmonary effort is normal.   Lymphadenopathy:      Cervical: No cervical adenopathy.   Skin:     General: Skin is warm and dry.   Neurological:      Mental Status: He is alert and oriented  to person, place, and time.       Assessment & Plan:   1. Otalgia of both ears (Primary)  -     ENT Referral - [place] ([facility])  Try OTC allergy pill such as claritin or allegra x 1 week  If not improving then see ENT     MILE Early, 7/8/2024, 3:42 PM

## 2024-07-16 ENCOUNTER — HOSPITAL ENCOUNTER (OUTPATIENT)
Age: 49
Discharge: HOME | End: 2024-07-16
Payer: COMMERCIAL

## 2024-07-16 ENCOUNTER — E-VISIT (OUTPATIENT)
Dept: TELEHEALTH | Age: 49
End: 2024-07-16
Payer: COMMERCIAL

## 2024-07-16 VITALS
OXYGEN SATURATION: 99 % | SYSTOLIC BLOOD PRESSURE: 136 MMHG | TEMPERATURE: 98 F | HEART RATE: 105 BPM | DIASTOLIC BLOOD PRESSURE: 87 MMHG | RESPIRATION RATE: 16 BRPM

## 2024-07-16 DIAGNOSIS — R09.81 NASAL CONGESTION WITH RHINORRHEA: ICD-10-CM

## 2024-07-16 DIAGNOSIS — J01.90 VIRAL SINORHINITIS: Primary | ICD-10-CM

## 2024-07-16 DIAGNOSIS — J34.89 NASAL CONGESTION WITH RHINORRHEA: ICD-10-CM

## 2024-07-16 DIAGNOSIS — B34.9 VIRAL ILLNESS: Primary | ICD-10-CM

## 2024-07-16 DIAGNOSIS — R09.82 POST-NASAL DRIP: ICD-10-CM

## 2024-07-16 PROCEDURE — 99213 OFFICE O/P EST LOW 20 MIN: CPT

## 2024-07-16 PROCEDURE — 99421 OL DIG E/M SVC 5-10 MIN: CPT | Performed by: PHYSICIAN ASSISTANT

## 2024-07-16 RX ORDER — BENZONATATE 200 MG/1
200 CAPSULE ORAL 3 TIMES DAILY PRN
Qty: 21 CAPSULE | Refills: 0 | Status: SHIPPED | OUTPATIENT
Start: 2024-07-16

## 2024-07-16 NOTE — DISCHARGE INSTRUCTIONS
over-the-counter Flonase nasal spray and 24-hour Zyrtec stop the Sudafed as it may be giving you rebound symptoms.   the benzonatate prescription to help with the cough and take it as needed.  Take over-the-counter ibuprofen and Tylenol for pain.  Follow-up with your primary care provider in a week at that time if you are still having sinus pressure congestion or discolored nasal discharge and have a low-grade temp or fever at that time you may need antibiotics for possible sinus infection but currently you do not have a bacterial sinus infection and antibiotics are not indicated at this time.  If you develop high fever chest pain shortness of breath nausea vomiting diarrhea go to the nearest emergency department.   no

## 2024-07-16 NOTE — ED INITIAL ASSESSMENT (HPI)
Patient arrived ambulatory to room c/o symptoms that started 4 days ago. +productive cough +nasal congestion. No fevers.

## 2024-07-16 NOTE — ED PROVIDER NOTES
Patient Seen in: [facility]      History     Chief Complaint   Patient presents with    Cough     Stated Complaint: cough    Subjective:   HPI    This is a 49-year-old male with history of scoliosis and allergies presenting with runny nose congestion postnasal drip and a cough.  Patient states for about 4 days he has had a cough nasal congestion postnasal drip and runny nose has been doing Sudafed and Mucinex from over-the-counter.  No home COVID testing.  No fever chest pain shortness of breath nausea vomiting or diarrhea.    Objective:   Past Medical History:    Priapism    Scoliosis    Urethral stricture              Past Surgical History:   Procedure Laterality Date    Cystoscopy,dil urethral stricture  9-24-08    cystoscopy/UD    Cystoscopy,dil urethral stricture  7/1/14    Mary    Cystourethroscopy  5-25-12    cysto- Mary    Cystourethroscopy  11/22/13    cysto-DrRae Mary    Cystourethroscopy  11/22/16    cysto-  Mary    Cystourethroscopy  11/14/2017    cysto - dr mary    Cystourethroscopy  04/03/2020    outside cystoscopy, 16fr thin bulbar stricture (No UD mentioned)    Neck/chest procedure unlisted  2000    Removal of a bone growth     Spine fusion,poster,7-12 sgmts  2004    T2-T12 for thoracolumbar scoliosis                 Social History     Socioeconomic History    Marital status: Single    Number of children: 0   Tobacco Use    Smoking status: Never    Smokeless tobacco: Never   Vaping Use    Vaping status: Never Used   Substance and Sexual Activity    Alcohol use: Yes     Alcohol/week: 3.0 standard drinks of alcohol     Types: 3 Cans of beer per week     Comment: social    Drug use: No    Sexual activity: Yes     Partners: Female   Other Topics Concern    Caffeine Concern No    Stress Concern No    Weight Concern No     Social Determinants of Health      Received from [facility], [facility]    Social Connections    Received from [facility]  [facility], [facility]    Housing Stability              Review of Systems    Positive for stated Chief Complaint: Cough    Other systems are as noted in HPI.  Constitutional and vital signs reviewed.      All other systems reviewed and negative except as noted above.    Physical Exam     ED Triage Vitals [07/16/24 0920]   /87   Pulse 105   Resp 16   Temp 98.4 °F (36.9 °C)   Temp src Temporal   SpO2 99 %   O2 Device None (Room air)       Current Vitals:   Vital Signs  BP: 136/87  Pulse: 105  Resp: 16  Temp: 98.4 °F (36.9 °C)  Temp src: Temporal    Oxygen Therapy  SpO2: 99 %  O2 Device: None (Room air)            Physical Exam  Vitals and nursing note reviewed.   Constitutional:       Appearance: Normal appearance.   HENT:      Right Ear: Tympanic membrane normal.      Left Ear: Tympanic membrane normal.      Nose: Congestion and rhinorrhea present.      Mouth/Throat:      Mouth: Mucous membranes are moist.      Pharynx: Oropharynx is clear. No posterior oropharyngeal erythema.   Eyes:      Conjunctiva/sclera: Conjunctivae normal.   Cardiovascular:      Rate and Rhythm: Normal rate.   Pulmonary:      Effort: Pulmonary effort is normal. No respiratory distress.      Breath sounds: Normal breath sounds. No wheezing.      Comments: + dry cough  Musculoskeletal:         General: Normal range of motion.      Cervical back: Normal range of motion. No rigidity.   Skin:     General: Skin is warm and dry.      Capillary Refill: Capillary refill takes less than 2 seconds.   Neurological:      General: No focal deficit present.      Mental Status: He is alert and oriented to person, place, and time.   Psychiatric:         Mood and Affect: Mood normal.               ED Course   Labs Reviewed - No data to display            MDM                        Medical Decision Making  49-year-old male nontoxic-appearing afebrile no hypoxia or distress presenting with viral symptoms for 4 days.  DDx URI versus  seasonal allergies versus viral sinusitis versus a head cold versus COVID.  No clinical indication for labs or imaging discussed the possibility of COVID and offered a COVID swab he declined.  Discussed clinically he does not have a bacterial sinusitis so antibiotics are not indicated at this time and discussed prescribing benzonatate for his cough recommended over-the-counter Flonase and Zyrtec and stopping the Sudafed as he could be getting rebound nasal congestion and runny nose.  Discussed ibuprofen and Tylenol for pain.  Discussed outpatient follow-up with primary care provider in a week all education instructions outpatient follow-up ER precautions placed in discharge paperwork.  Patient acknowledged understanding discharge instructions.    Problems Addressed:  Nasal congestion with rhinorrhea: acute illness or injury  Post-nasal drip: acute illness or injury  Viral illness: acute illness or injury    Risk  OTC drugs.  Prescription drug management.        Disposition and Plan     Clinical Impression:  1. Viral illness    2. Nasal congestion with rhinorrhea    3. Post-nasal drip         Disposition:  Discharge  7/16/2024  9:37 am    Follow-up:  Carol Raymond DO  [address] [address]  [phone]    In 1 week            Medications Prescribed:  Discharge Medication List as of 7/16/2024  9:37 AM        START taking these medications    Details   benzonatate 200 MG Oral Cap Take 1 capsule (200 mg total) by mouth 3 (three) times daily as needed for cough., Normal, Disp-21 capsule, R-0

## 2024-07-16 NOTE — PROGRESS NOTES
Herson Moya is a 49 year old male who initiated e-visit care today.    HPI:   See answers to questionnaire submission     Current Outpatient Medications   Medication Sig Dispense Refill    tamsulosin 0.4 MG Oral Cap Take 1 capsule (0.4 mg total) by mouth daily. Take 1/2 hour following the same meal each day 90 capsule 3      Past Medical History:    Priapism    Scoliosis    Urethral stricture      Past Surgical History:   Procedure Laterality Date    Cystoscopy,dil urethral stricture  9-24-08    cystoscopy/UD    Cystoscopy,dil urethral stricture  7/1/14    Mary    Cystourethroscopy  5-25-12    cysto-DrRae Restrepo    Cystourethroscopy  11/22/13    cysto-Dr. Restrepo    Cystourethroscopy  11/22/16    cysto- Dr. Restrepo    Cystourethroscopy  11/14/2017    cysto - dr mary    Cystourethroscopy  04/03/2020    outside cystoscopy, 16fr thin bulbar stricture (No UD mentioned)    Neck/chest procedure unlisted  2000    Removal of a bone growth     Spine fusion,poster,7-12 sgmts  2004    T2-T12 for thoracolumbar scoliosis       Family History   Problem Relation Age of Onset    Diabetes Father     Heart Disorder Father         CABG    Hypertension Father     Cancer Father         Lung    Heart Disorder Mother     Hypertension Mother     Heart Disease Maternal Grandmother     Heart Disease Maternal Grandfather     Stroke Paternal Grandmother     Heart Disease Paternal Grandmother     Stroke Paternal Grandfather     Heart Disease Paternal Grandfather     Colon Cancer Neg       Social History:  Social History     Socioeconomic History    Marital status: Single    Number of children: 0   Tobacco Use    Smoking status: Never    Smokeless tobacco: Never   Vaping Use    Vaping status: Never Used   Substance and Sexual Activity    Alcohol use: Yes     Alcohol/week: 3.0 standard drinks of alcohol     Types: 3 Cans of beer per week     Comment: social    Drug use: No    Sexual activity: Yes     Partners: Female   Other Topics Concern    Caffeine Concern  No    Stress Concern No    Weight Concern No     Social Determinants of Health      Received from [facility], [facility]    Social Connections    Received from [facility], [facility]    Housing Stability         ASSESSMENT AND PLAN:       Diagnoses and all orders for this visit:    Viral sinorhinitis  -     Online E&M 5-10 Min       Explained at 4 days of symptoms low concern for bacterial rhinosinusitis.  Offered cough medication and post-dated rx to fill only if not better in 5-6 days. Patient stopped responding to exchange and reported to [facility].  Evisit closed and care deferred to IC provider     Duration of  the service:  10 minutes      See [employer] message exchange and Patient Instructions for Comfort Care and patient education.

## 2024-08-01 ENCOUNTER — OFFICE VISIT (OUTPATIENT)
Facility: CLINIC | Age: 49
End: 2024-08-01
Payer: COMMERCIAL

## 2024-08-01 VITALS
HEART RATE: 85 BPM | DIASTOLIC BLOOD PRESSURE: 72 MMHG | HEIGHT: 68 IN | BODY MASS INDEX: 23.79 KG/M2 | SYSTOLIC BLOOD PRESSURE: 114 MMHG | WEIGHT: 157 LBS | OXYGEN SATURATION: 98 %

## 2024-08-01 DIAGNOSIS — R05.2 SUBACUTE COUGH: ICD-10-CM

## 2024-08-01 DIAGNOSIS — R30.0 DYSURIA: Primary | ICD-10-CM

## 2024-08-01 LAB
APPEARANCE: CLEAR
BILIRUBIN: NEGATIVE
GLUCOSE (URINE DIPSTICK): NEGATIVE MG/DL
KETONES (URINE DIPSTICK): NEGATIVE MG/DL
LEUKOCYTES: NEGATIVE
MULTISTIX EXPIRATION DATE: NORMAL DATE
MULTISTIX LOT#: NORMAL NUMERIC
NITRITE, URINE: NEGATIVE
OCCULT BLOOD: NEGATIVE
PH, URINE: 5.5 (ref 4.5–8)
PROTEIN (URINE DIPSTICK): NEGATIVE MG/DL
SPECIFIC GRAVITY: 1.02 (ref 1–1.03)
URINE-COLOR: YELLOW
UROBILINOGEN,SEMI-QN: 0.2 MG/DL (ref 0–1.9)

## 2024-08-01 PROCEDURE — 3008F BODY MASS INDEX DOCD: CPT | Performed by: FAMILY MEDICINE

## 2024-08-01 PROCEDURE — 99214 OFFICE O/P EST MOD 30 MIN: CPT | Performed by: FAMILY MEDICINE

## 2024-08-01 PROCEDURE — 81002 URINALYSIS NONAUTO W/O SCOPE: CPT | Performed by: FAMILY MEDICINE

## 2024-08-01 PROCEDURE — 3078F DIAST BP <80 MM HG: CPT | Performed by: FAMILY MEDICINE

## 2024-08-01 PROCEDURE — 3074F SYST BP LT 130 MM HG: CPT | Performed by: FAMILY MEDICINE

## 2024-08-01 RX ORDER — CETIRIZINE HYDROCHLORIDE 10 MG/1
10 TABLET ORAL DAILY
COMMUNITY

## 2024-08-01 RX ORDER — MELOXICAM 7.5 MG/1
7.5 TABLET ORAL DAILY
Qty: 10 TABLET | Refills: 0 | Status: SHIPPED | OUTPATIENT
Start: 2024-08-01

## 2024-08-01 RX ORDER — BENZONATATE 200 MG/1
200 CAPSULE ORAL 3 TIMES DAILY PRN
Qty: 30 CAPSULE | Refills: 0 | Status: SHIPPED | OUTPATIENT
Start: 2024-08-01

## 2024-08-01 NOTE — PROGRESS NOTES
HPI:    Patient ID: Herson Moya is a 49 year old male who presents for dysuria.    HPI  Having burning with urination.  Started 2 weeks ago.   This is a chronic intermittent issue and he has seen urologists.   Worsened Friday, and went to IC. Urine was normal and ultrasound was normal. Was told to see urology.   No other associated symptoms.   No hematuria.   No urinary frequency or urgency.   No fevers.   He is requesting a muscle relaxer.   Tried OTC meds and didn't help.   Completed pelvic floor PT.  Current only tamsulosin.   Neither of the above have helped.     Was in IC a couple weeks ago for acute on chronic cough. Benzonatate works well and would like refill.     Past Medical History:    Priapism    Scoliosis    Urethral stricture        Current Outpatient Medications   Medication Sig Dispense Refill    cetirizine 10 MG Oral Tab Take 1 tablet (10 mg total) by mouth daily.      Meloxicam 7.5 MG Oral Tab Take 1 tablet (7.5 mg total) by mouth daily. 10 tablet 0    benzonatate 200 MG Oral Cap Take 1 capsule (200 mg total) by mouth 3 (three) times daily as needed for cough. 30 capsule 0    tamsulosin 0.4 MG Oral Cap Take 1 capsule (0.4 mg total) by mouth daily. Take 1/2 hour following the same meal each day 90 capsule 3        Allergies   Allergen Reactions    Barium Sulfate OTHER (SEE COMMENTS)    No Known Allergies HIVES    Peanut (Diagnostic) HIVES    Ciprofloxacin RASH    Penicillins RASH     Pt states he can take Amoxicillin, but Penicillin caused a rash    Doxycycline NAUSEA ONLY    Gel Base RASH     Gel used for ultrasound  Gel used for ultrasound       Review of Systems   See HPI. Otherwise negative.         /72   Pulse 85   Ht 5' 8\" (1.727 m)   Wt 157 lb (71.2 kg)   SpO2 98%   BMI 23.87 kg/m²     PHYSICAL EXAM:   Physical Exam  Constitutional:       General: He is not in acute distress.     Appearance: Normal appearance. He is not ill-appearing, toxic-appearing or diaphoretic.   HENT:       Head: Normocephalic and atraumatic.   Eyes:      Extraocular Movements: Extraocular movements intact.      Conjunctiva/sclera: Conjunctivae normal.   Cardiovascular:      Rate and Rhythm: Normal rate and regular rhythm.      Pulses: Normal pulses.      Heart sounds: Normal heart sounds. No murmur heard.     No friction rub. No gallop.   Pulmonary:      Effort: Pulmonary effort is normal. No respiratory distress.      Breath sounds: Normal breath sounds. No wheezing, rhonchi or rales.   Abdominal:      General: Abdomen is flat. Bowel sounds are normal. There is no distension.      Palpations: Abdomen is soft. There is no mass.      Tenderness: There is no abdominal tenderness. There is no guarding or rebound.      Hernia: No hernia is present.   Musculoskeletal:      Cervical back: Neck supple.      Right lower leg: No edema.      Left lower leg: No edema.   Skin:     General: Skin is warm and dry.      Capillary Refill: Capillary refill takes less than 2 seconds.   Neurological:      General: No focal deficit present.      Mental Status: He is alert.   Psychiatric:         Mood and Affect: Mood normal.         Behavior: Behavior normal.         Thought Content: Thought content normal.         Judgment: Judgment normal.             ASSESSMENT/PLAN:     Encounter Diagnoses   Name Primary?    Dysuria Yes    Subacute cough        1. Dysuria  - URINALYSIS NONAUTO W/O SCOPE  -Reviewed recent external labs.  -Also reviewed prior urology note with Dr. Jules.   -Recommend urology f/u.  -May trial meloxicam daily for the next week.     2. Subacute cough  -Okay to resume benzonatate prn. Refill provided.     Meds This Visit:  Requested Prescriptions     Signed Prescriptions Disp Refills    Meloxicam 7.5 MG Oral Tab 10 tablet 0     Sig: Take 1 tablet (7.5 mg total) by mouth daily.    benzonatate 200 MG Oral Cap 30 capsule 0     Sig: Take 1 capsule (200 mg total) by mouth 3 (three) times daily as needed for cough.        Imaging & Referrals:  None       Ish Snow, DO  ID#[id number]

## 2024-09-10 ENCOUNTER — OFFICE VISIT (OUTPATIENT)
Dept: PHYSICAL MEDICINE AND REHAB | Facility: CLINIC | Age: 49
End: 2024-09-10
Payer: COMMERCIAL

## 2024-09-10 VITALS — WEIGHT: 157 LBS | HEIGHT: 68 IN | BODY MASS INDEX: 23.79 KG/M2

## 2024-09-10 DIAGNOSIS — M79.10 MYALGIA: ICD-10-CM

## 2024-09-10 DIAGNOSIS — M41.25 OTHER IDIOPATHIC SCOLIOSIS, THORACOLUMBAR REGION: ICD-10-CM

## 2024-09-10 DIAGNOSIS — D16.9 OSTEOCHONDROMA: ICD-10-CM

## 2024-09-10 DIAGNOSIS — M25.552 BILATERAL HIP PAIN: Primary | ICD-10-CM

## 2024-09-10 DIAGNOSIS — M25.551 BILATERAL HIP PAIN: Primary | ICD-10-CM

## 2024-09-10 DIAGNOSIS — M54.16 LUMBAR RADICULOPATHY: ICD-10-CM

## 2024-09-10 PROCEDURE — 99214 OFFICE O/P EST MOD 30 MIN: CPT | Performed by: PHYSICAL MEDICINE & REHABILITATION

## 2024-09-10 PROCEDURE — 3008F BODY MASS INDEX DOCD: CPT | Performed by: PHYSICAL MEDICINE & REHABILITATION

## 2024-09-10 RX ORDER — DICLOFENAC SODIUM 75 MG/1
75 TABLET, DELAYED RELEASE ORAL 2 TIMES DAILY
Qty: 60 TABLET | Refills: 1 | Status: SHIPPED | OUTPATIENT
Start: 2024-09-10

## 2024-09-10 RX ORDER — CYCLOBENZAPRINE HCL 5 MG
TABLET ORAL
Qty: 90 TABLET | Refills: 0 | Status: SHIPPED | OUTPATIENT
Start: 2024-09-10

## 2024-09-10 NOTE — PROGRESS NOTES
[facility]  Progress Note    CHIEF COMPLAINT:    Chief Complaint   Patient presents with    Pelvic Pain     New patient, comes in with bilateral hip and L leg stabbing pain. Denies T/N. Denies weakness. Rates pain 7/10. XR's done in 2022. Admits some PT this year. Symptoms began 2 years ago, Denies injury. Denies injections. Takes Aleve PRN.       History of Present Illness:  The patient is a 49 year old right-handed male with a significant past medical history of scoliosis status post T2-T12 fusion in 2004, urethral stricture currently followed by urology, and pelvic floor dysfunction.  He has been seen by Dr. Angelita Arrington in the past and has also completed multiple sessions of pelvic floor physical therapy.  More significantly, he has a history of osteochondromatosis.  He has also been seen by Dr. Gil Jeffrey back in 2022.  I have reviewed all of these notes.  Today he is mostly complaining of bilateral hip and groin pain (left greater than right) with occasional radiation down the leg.  This will go down into the medial ankle bilaterally.  His symptoms are aggravated more so in the morning as well as nighttime.  His pain can be as high as a 9 out of 10 but currently about a 7 out of 10.  He has tried pelvic floor physical therapy this year with mild improvement.  He is taking occasional NSAIDs for pain.  No previous injections have been performed.  He has had imaging of the pelvis performed which I reviewed back in 2022.  He denies any paresthesias or focal weakness.  He currently works in IT.    PAST MEDICAL HISTORY:  Past Medical History:    Priapism    Scoliosis    Urethral stricture       SURGICAL HISTORY:  Past Surgical History:   Procedure Laterality Date    Cystoscopy,dil urethral stricture  9-24-08    cystoscopy/UD    Cystoscopy,dil urethral stricture  7/1/14    Kaye    Cystourethroscopy  5-25-12    cysto- Kaye    Cystourethroscopy  11/22/13    cysto-  Kaye    Cystourethroscopy  11/22/16    normanoJamey Hernandez    Cystourethroscopy  11/14/2017    cysto - dr hernandez    Cystourethroscopy  04/03/2020    outside cystoscopy, 16fr thin bulbar stricture (No UD mentioned)    Neck/chest procedure unlisted  2000    Removal of a bone growth     Spine fusion,poster,7-12 sgmts  2004    T2-T12 for thoracolumbar scoliosis        SOCIAL HISTORY:   Social History     Occupational History    Not on file   Tobacco Use    Smoking status: Never    Smokeless tobacco: Never   Vaping Use    Vaping status: Never Used   Substance and Sexual Activity    Alcohol use: Yes     Alcohol/week: 3.0 standard drinks of alcohol     Types: 3 Cans of beer per week     Comment: social    Drug use: No    Sexual activity: Yes     Partners: Female       FAMILY HISTORY:   Family History   Problem Relation Age of Onset    Diabetes Father     Heart Disorder Father         CABG    Hypertension Father     Cancer Father         Lung    Heart Disorder Mother     Hypertension Mother     Heart Disease Maternal Grandmother     Heart Disease Maternal Grandfather     Stroke Paternal Grandmother     Heart Disease Paternal Grandmother     Stroke Paternal Grandfather     Heart Disease Paternal Grandfather     Colon Cancer Neg        CURRENT MEDICATIONS:   Current Outpatient Medications   Medication Sig Dispense Refill    cyclobenzaprine 5 MG Oral Tab 5 mg 0.5-2 tablets three times per day as needed for spasms. Do not operate heavy machinery while on this medication as it may make you sleepy 90 tablet 0    diclofenac 75 MG Oral Tab EC Take 1 tablet (75 mg total) by mouth 2 (two) times daily. Take with food for 2 weeks as directed and then as needed. 60 tablet 1    cetirizine 10 MG Oral Tab Take 1 tablet (10 mg total) by mouth daily.      Meloxicam 7.5 MG Oral Tab Take 1 tablet (7.5 mg total) by mouth daily. (Patient not taking: Reported on 9/10/2024) 10 tablet 0    benzonatate 200 MG Oral Cap Take 1 capsule (200 mg total) by mouth 3  (three) times daily as needed for cough. 30 capsule 0    tamsulosin 0.4 MG Oral Cap Take 1 capsule (0.4 mg total) by mouth daily. Take 1/2 hour following the same meal each day 90 capsule 3       ALLERGIES:   Allergies   Allergen Reactions    Barium Sulfate OTHER (SEE COMMENTS)    No Known Allergies HIVES    Peanut (Diagnostic) HIVES    Ciprofloxacin RASH    Penicillins RASH     Pt states he can take Amoxicillin, but Penicillin caused a rash    Doxycycline NAUSEA ONLY    Gel Base RASH     Gel used for ultrasound  Gel used for ultrasound       REVIEW OF SYSTEMS:   Review of Systems   Constitutional: Negative.    HENT: Negative.    Eyes: Negative.    Respiratory: Negative.    Cardiovascular: Negative.    Gastrointestinal: Negative.    Genitourinary: Negative.    Musculoskeletal: As per HPI  Skin: Negative.    Neurological: As per HPI  Endo/Heme/Allergies: Negative.    Psychiatric/Behavioral: Negative.      All other systems reviewed and are negative. Pertinent positives and negatives noted in the HPI.        PHYSICAL EXAM:   Ht 68\"   Wt 157 lb (71.2 kg)   BMI 23.87 kg/m²     Body mass index is 23.87 kg/m².      General: No immediate distress  Head: Normocephalic/ Atraumatic  Eyes: Extra-occular movements intact.   Ears: No auricular hematoma or deformities  Mouth: No lesions or ulcerations  Heart: peripheral pulses intact. Normal capillary refill.   Lungs: Non-labored respirations  Abdomen: No abdominal guarding  Extremities: No lower extremity edema bilaterally   Skin: No lesions noted.   Cognition: alert & oriented x 3, attentive, able to follow 2 step commands, comprehention intact, spontaneous speech intact  Motor:    Musculoskeletal:    HIP:  Inspection: no erythema, swelling, or obvious deformity  Palpation: Tender to palpation over the bilateral medial groin over the adductor muscles  ROM: intact to all planes of motion with pain during flexion and internal rotation  Strength: 5/5 in all myotomes of the  BILATERAL lower extremities except 4 out of 5 during left great toe extension  Sensation: Intact to light touch in all dermatomes of the lower extremities   Reflexes: 2/4 at L4 and S1  Hip Grind Test: Positive bilaterally  JUNIE: Positive on the left groin pain  FADIR: Positive bilaterally for groin pain        Data  Office Visit on 08/01/2024   Component Date Value Ref Range Status    Glucose Urine 08/01/2024 Negative  Negative mg/dL Final    Bilirubin Urine 08/01/2024 Negative  Negative Final    Ketones, UA 08/01/2024 Negative  Negative - Trace mg/dL Final    Spec Gravity 08/01/2024 1.020  1.005 - 1.030 Final    Blood Urine 08/01/2024 Negative  Negative Final    PH Urine 08/01/2024 5.5  5.0 - 8.0 Final    Protein Urine 08/01/2024 Negative  Negative - Trace mg/dL Final    Urobilinogen Urine 08/01/2024 0.2  0.2 - 1.0 mg/dL Final    Nitrite Urine 08/01/2024 Negative  Negative Final    Leukocyte Esterase Urine 08/01/2024 Negative  Negative Final    APPEARANCE 08/01/2024 clear  Clear Final    Color Urine 08/01/2024 yellow  Yellow Final    Multistix Lot# 08/01/2024 [id number]  Numeric Final    Multistix Expiration Date 08/01/2024 [id number]  Date Final   ]      Radiology Imaging:  I reviewed with the patient his X-ray of the lumbar spine, pelvis bilateral, and scoliosis  from 2022  XR SCOLIOSIS SPINE 2-3 VIEWS (CPT=72082)  Narrative: PROCEDURE: XR SCOLIOSIS SPINE 2-3 VIEWS  (CPT=72082)     COMPARISON: [facility], X LUMBAR SPINE AP LAT, 4/11/2012, 7:19 PM.     INDICATIONS: M41.25 Other idiopathic scoliosis.     TECHNIQUE: Long cassette radiographs of the thoracolumbar spine were obtained in AP and lateral standing projections.       FINDINGS: No acute fracture or acute malalignment.  Multilevel thoracic spondylosis     ALIGNMENT: S shaped scoliosis dorsal lumbar spine.  Primary levoscoliosis in the dorsal spine measures 22ø.  Compensatory dextroscoliosis lumbar spine measures 10ø.  PELVIC  TILT: Minimal pelvic tilt.  Right iliac crest 4 mm higher than the left.  BONY ANOMALIES: Benign broad-based bony exostosis arising from the proximal shaft of the right humerus.  OTHER: Multilevel thoracic spinal fixation hardware extending from the T2 through T12 levels with parallel longitudinal rods and multiple pedicle screws              Impression: CONCLUSION:   1. S-shaped scoliosis dorsal lumbar spine and superimposed thoracic spondylosis..    2. Levoscoliosis mid dorsal spine measures 22ø.  3. Compensatory dextroscoliosis lumbar spine measures 10ø.  4. Multilevel thoracic spinal fusion hardware from T2 through T12 remains intact.           Dictated by (CST): Darío Hernandez MD on 11/30/2022 at 8:36 AM       Finalized by (CST): Darío Hernandez MD on 11/30/2022 at 8:45 AM          XR HIP W OR WO PELVIS 2 OR 3 VIEWS, RIGHT (CPT=73502)  Narrative: PROCEDURE: XR HIP W OR WO PELVIS 2 OR 3 VIEWS, RIGHT (CPT=73502)     COMPARISON: [facility], CT PF RENAL STONE ABD/P WO CON, 9/30/2013, 5:51 PM.     INDICATIONS: Bilateral hip pain ongoing for several months.     TECHNIQUE: 1 AP view of the pelvis was obtained. Additional frontal and frog-leg lateral radiographs of the right hip were acquired.       FINDINGS:   BONES: No acute fracture or dislocation is evident. There is partial delineation of a right iliac exostosis. Chronic-appearing deformities of the femoral necks are redemonstrated, not significantly changed. Slight uncovering of the right femoral head is   suggested. The iliopectineal and ilioischial lines are uninterrupted. The arcuate lines, insofar as seen, are intact. No suspicious osseous lesions are detected. The joint spaces are preserved without evidence of significant arthropathy.   SOFT TISSUES: Negative for visible soft tissue swelling or radiopaque foreign body.    EFFUSION: None visible.                   Impression: CONCLUSION:   1. Chronic-appearing deformities of the  femoral necks bilaterally, perhaps developmental or indicative of underlying osseous lesions. Consider follow-up MRI to ascertain contiguity of the respective intramedullary spaces and cortex.     2. Partially visualized stable right iliac exostosis. Correlation for potential hereditary multiple osteochondromatosis is advised.           elm-remote.        Dictated by (CST): Jose Rios MD on 11/30/2022 at 0:24 AM       Finalized by (CST): Jose Rios MD on 11/30/2022 at 0:27 AM          XR LUMBAR SPINE AP LAT FLEX EXT (CPT=72110)  Narrative: PROCEDURE: XR LUMBAR SPINE AP LAT FLEX EXT EM (CPT=72120)     COMPARISON: [facility], X LUMBAR SPINE AP LAT, 4/11/2012, 7:19 PM.     INDICATIONS: Other idiopathic scoliosis with thoracolumbar radiculopathy.     TECHNIQUE: Lumbar spine radiographs with flexion and extension views       FINDINGS:    ALIGNMENT:   The anatomic lumbar lordosis is preserved.  VERTEBRAL BODIES:   A total of 5 non-rib-bearing lumbar-type vertebral bodies are identified. Lower thoracic posterior fusion hardware is partially delineated. No fracture, subluxation, or bony lesion is visible.  DISC SPACES: There is slight intervertebral disc space narrowing at L5-S1.  SACROILIAC JOINTS: Unremarkable.    FLEXION/EXTENSION: There is no evidence of provoked subluxation on dynamic views.                 Impression: CONCLUSION:   1. Mild disc space narrowing at L5-S1.     2. No radiographically visible acute osseous injury of the lumbar spine.     3. Partially delineated lower thoracic posterior fusion hardware.           elm-remote.        Dictated by (CST): Jose Rios MD on 11/30/2022 at 0:16 AM       Finalized by (CST): Jose Rios MD on 11/30/2022 at 0:19 AM              ASSESSMENT AND PLAN:  Herson is a pleasant 49-year-old male presents with bilateral hip and pelvis pain with radiation down both legs.  I have reviewed his most recent x-rays of the pelvis from  2022 where he does have underlying osseous lesions from his osteochondroma.  I would like an MRI to further investigate these as he continues to have significant discomfort in this area.  If these are osteochondromas and are benign, then I would recommend bilateral hip corticosteroid injections.  I have also ordered new x-rays of the pelvis to investigate this further.  I would like for him to use diclofenac as well as Flexeril for pain.  He should start formal physical therapy focusing on the hips and less so on pelvic floor.  He is not having any pelvic floor symptoms such as urinary incontinence or pain with urination.  He will see me again in about 2 months or sooner for an MRI review.  At that point, we will determine if hip injections are appropriate.       RTC in 2 months  Discharge Instructions were provided as documented in AVS summary.  The patient was in agreement with the assessment and plan.  All questions were answered.  There were no barriers to learning.         1. Bilateral hip pain    2. Other idiopathic scoliosis, thoracolumbar region    3. Osteochondroma    4. Myalgia    5. Lumbar radiculopathy        Alex B. Behar MD  Physical Medicine and Rehabilitation/Sports Medicine  [facility]

## 2024-09-10 NOTE — PATIENT INSTRUCTIONS
1) Please begin physical therapy as soon as possible. This will focus on the hips  2) Please call and schedule your MRI of the Pelvis at [phone].  Once you have your MRI scheduled, then call my office again to schedule a follow-up visit soon after your MRI so we may review the images together.  3) Depending on what the MRI shows will be if we determine to perform BILATERAL hip injections  4) OK to continue NSAIDS and Tylenol as needed for pain. OK to continue muscle relaxer's as well   5) Start cyclobenzaprine 5 mg 0.5-2 tablets three times per day as needed for spasms. Do not operate heavy machinery while on this medication as it may make you sleepy.   6) Please bring in labs  7) Take Diclofenac 75 mg 1 tablet twice per day with food for the next two weeks and then as needed but no more than 2 tablets per day. Do not take with any other NSAIDS (Ibuprofen, Advil, Aleve, Naprosyn etc). OK to take Tylenol 500 mg every 6 hours as needed for pain. If you develop any side effects including stomach aches, nausea, vomiting, or other gastrointestinal symptoms, stop the medication and call my office.   8) Please get X-rays of the Pelvis  today on your way out.

## 2024-10-21 LAB
AMB EXT BILIRUBIN, TOTAL: 0.6 MG/DL
AMB EXT BUN: 18 MG/DL
AMB EXT CALCIUM: 3.5
AMB EXT CARBON DIOXIDE: 27
AMB EXT CHLORIDE: 103
AMB EXT CHOL/HDL RATIO: 2.9
AMB EXT CHOLESTEROL, TOTAL: 165 MG/DL
AMB EXT CMP ALT: 27 U/L
AMB EXT CMP AST: 17 U/L
AMB EXT EGFR NON-AA: 109
AMB EXT GLUCOSE: 77 MG/DL
AMB EXT HDL CHOLESTEROL: 56 MG/DL
AMB EXT HEMATOCRIT: 45.7
AMB EXT HEMOGLOBIN: 14.6
AMB EXT HGBA1C: 5.1 %
AMB EXT LDL CHOLESTEROL, DIRECT: 94 MG/DL
AMB EXT MCV: 88
AMB EXT PLATELETS: 235
AMB EXT POSTASSIUM: 4.8 MMOL/L
AMB EXT PSA SCREEN: 0.6 NG/ML
AMB EXT SODIUM: 142 MMOL/L
AMB EXT TOTAL PROTEIN: 0.17
AMB EXT TOTAL VITAMIN D: 14.1
AMB EXT TRIGLYCERIDES: 77 MG/DL
AMB EXT TSH: 2.07 MIU/ML
AMB EXT WBC: 4.4 X10(3)UL

## 2024-10-29 ENCOUNTER — HOSPITAL ENCOUNTER (OUTPATIENT)
Dept: MRI IMAGING | Facility: HOSPITAL | Age: 49
Discharge: HOME | End: 2024-10-29
Attending: PHYSICAL MEDICINE & REHABILITATION
Payer: COMMERCIAL

## 2024-10-29 ENCOUNTER — HOSPITAL ENCOUNTER (OUTPATIENT)
Dept: GENERAL RADIOLOGY | Facility: HOSPITAL | Age: 49
Discharge: HOME | End: 2024-10-29
Attending: PHYSICAL MEDICINE & REHABILITATION
Payer: COMMERCIAL

## 2024-10-29 DIAGNOSIS — M41.25 OTHER IDIOPATHIC SCOLIOSIS, THORACOLUMBAR REGION: ICD-10-CM

## 2024-10-29 DIAGNOSIS — M54.16 LUMBAR RADICULOPATHY: ICD-10-CM

## 2024-10-29 DIAGNOSIS — M79.10 MYALGIA: ICD-10-CM

## 2024-10-29 DIAGNOSIS — M25.552 BILATERAL HIP PAIN: ICD-10-CM

## 2024-10-29 DIAGNOSIS — D16.9 OSTEOCHONDROMA: ICD-10-CM

## 2024-10-29 DIAGNOSIS — M25.551 BILATERAL HIP PAIN: ICD-10-CM

## 2024-10-29 PROCEDURE — 73523 X-RAY EXAM HIPS BI 5/> VIEWS: CPT | Performed by: PHYSICAL MEDICINE & REHABILITATION

## 2024-10-29 PROCEDURE — 73522 X-RAY EXAM HIPS BI 3-4 VIEWS: CPT | Performed by: PHYSICAL MEDICINE & REHABILITATION

## 2024-12-24 ENCOUNTER — HOSPITAL ENCOUNTER (OUTPATIENT)
Age: 49
Discharge: HOME | End: 2024-12-24
Payer: COMMERCIAL

## 2024-12-24 VITALS
OXYGEN SATURATION: 100 % | TEMPERATURE: 98 F | RESPIRATION RATE: 20 BRPM | DIASTOLIC BLOOD PRESSURE: 90 MMHG | HEART RATE: 94 BPM | SYSTOLIC BLOOD PRESSURE: 131 MMHG

## 2024-12-24 DIAGNOSIS — J06.9 VIRAL UPPER RESPIRATORY TRACT INFECTION: Primary | ICD-10-CM

## 2024-12-24 PROCEDURE — 99213 OFFICE O/P EST LOW 20 MIN: CPT | Performed by: NURSE PRACTITIONER

## 2024-12-24 RX ORDER — BENZONATATE 200 MG/1
200 CAPSULE ORAL 3 TIMES DAILY PRN
Qty: 21 CAPSULE | Refills: 0 | Status: SHIPPED | OUTPATIENT
Start: 2024-12-24

## 2024-12-24 NOTE — ED INITIAL ASSESSMENT (HPI)
Pt presents with cough x 2-3 days. Minimal phlegm. No fever.     Pt took home Covid Antigen Test - negative yesterday.

## 2024-12-24 NOTE — ED PROVIDER NOTES
Patient Seen in: [facility]      History     Chief Complaint   Patient presents with    Cough     On/off - Entered by patient     Stated Complaint: Cough - On/off    Subjective:   HPI      This is a 49-year-old male with history of scoliosis presenting with cold symptoms.  Patient states that he has had a cough for about 2 to 3 days minimal phlegm with some congestion.  Patient states he did a home COVID test yesterday it was negative.  Denies fever chest pain shortness of breath nausea vomiting or diarrhea.    Objective:     Past Medical History:    Priapism    Scoliosis    Urethral stricture              Past Surgical History:   Procedure Laterality Date    Cystoscopy,dil urethral stricture  9-24-08    cystoscopy/UD    Cystoscopy,dil urethral stricture  7/1/14    Kaye    Cystourethroscopy  5-25-12    cysto-Dr. Hernandez    Cystourethroscopy  11/22/13    cysto-Dr. Hernandez    Cystourethroscopy  11/22/16    cysto- Dr. Hernandez    Cystourethroscopy  11/14/2017    cysto - dr hernandez    Cystourethroscopy  04/03/2020    outside cystoscopy, 16fr thin bulbar stricture (No UD mentioned)    Neck/chest procedure unlisted  2000    Removal of a bone growth     Spine fusion,poster,7-12 sgmts  2004    T2-T12 for thoracolumbar scoliosis                 Social History     Socioeconomic History    Marital status: Single    Number of children: 0   Tobacco Use    Smoking status: Never    Smokeless tobacco: Never   Vaping Use    Vaping status: Never Used   Substance and Sexual Activity    Alcohol use: Yes     Alcohol/week: 3.0 standard drinks of alcohol     Types: 3 Cans of beer per week     Comment: social    Drug use: No    Sexual activity: Yes     Partners: Female   Other Topics Concern    Caffeine Concern No    Stress Concern No    Weight Concern No     Social Drivers of Health      [facility]    Social Connections    Received from [facility], [facility]  [facility] Stability              Review of Systems    Positive for stated complaint: Cough - On/off  Other systems are as noted in HPI.  Constitutional and vital signs reviewed.      All other systems reviewed and negative except as noted above.    Physical Exam     ED Triage Vitals   BP 12/24/24 1104 131/90   Pulse 12/24/24 1104 94   Resp 12/24/24 1104 20   Temp 12/24/24 1104 98.2 °F (36.8 °C)   Temp src 12/24/24 1104 Oral   SpO2 12/24/24 1124 100 %   O2 Device --        Current Vitals:   Vital Signs  BP: 131/90  Pulse: 94  Resp: 20  Temp: 98.2 °F (36.8 °C)  Temp src: Oral    Oxygen Therapy  SpO2: 100 %        Physical Exam  Vitals and nursing note reviewed.   Constitutional:       Appearance: Normal appearance.   HENT:      Right Ear: Tympanic membrane normal.      Left Ear: Tympanic membrane normal.      Nose: Congestion and rhinorrhea present.      Mouth/Throat:      Mouth: Mucous membranes are moist.      Pharynx: Oropharynx is clear. No posterior oropharyngeal erythema.   Eyes:      Conjunctiva/sclera: Conjunctivae normal.   Cardiovascular:      Rate and Rhythm: Normal rate.   Pulmonary:      Effort: Pulmonary effort is normal. No respiratory distress.      Breath sounds: Normal breath sounds. No wheezing.      Comments: + cough  Musculoskeletal:         General: Normal range of motion.      Cervical back: Normal range of motion. No rigidity or tenderness.   Lymphadenopathy:      Cervical: No cervical adenopathy.   Skin:     General: Skin is warm.      Capillary Refill: Capillary refill takes less than 2 seconds.   Neurological:      General: No focal deficit present.      Mental Status: He is alert and oriented to person, place, and time.           ED Course   Labs Reviewed - No data to display           MDM             Medical Decision Making  49-year-old male nontoxic-appearing afebrile no hypoxia distress with cold symptoms for 2 to 3 days negative home COVID test.  DDx URI  No interval changes since initial CDU provider note. Pt sleeping comfortably. Submandibular pain improved. NAD VSS. will continue antibiotics and monitoring. - STEFANIE Suarez versus another respiratory or viral illness versus influenza.  No clinical indication for labs or imaging offered influenza swab patient declined.  Discussed over-the-counter medications to help with symptoms or prescribing benzonatate patient requesting that the benzonatate be sent to the pharmacy but he will try Mucinex first recommend over-the-counter Flonase and Zyrtec to help with runny nose congestion may take Tylenol as needed for fever comfort or pain.  Discussed outpatient follow-up all education instructions including ER precautions placed in discharge paperwork.  Patient acknowledges understanding discharge instructions.    Problems Addressed:  Viral upper respiratory tract infection: acute illness or injury    Risk  OTC drugs.  Prescription drug management.        Disposition and Plan     Clinical Impression:  1. Viral upper respiratory tract infection         Disposition:  Discharge  12/24/2024 11:25 am    Follow-up:  Carol Raymond DO  [address] [place] [address]  [phone]    In 1 week            Medications Prescribed:  Discharge Medication List as of 12/24/2024 11:32 AM        START taking these medications    Details   !! benzonatate 200 MG Oral Cap Take 1 capsule (200 mg total) by mouth 3 (three) times daily as needed for cough., Normal, Disp-21 capsule, R-0       !! - Potential duplicate medications found. Please discuss with provider.              Supplementary Documentation:

## 2024-12-24 NOTE — DISCHARGE INSTRUCTIONS
You may start taking over-the-counter Mucinex recommend Flonase and Zyrtec to help with runny nose and congestion drink plenty of fluids Tylenol as needed for pain or discomfort follow-up with your primary care provider in a week if you develop high fever chest pain shortness of breath vomiting diarrhea or any new or worsening symptoms go to the nearest emergency department.

## 2025-01-02 ENCOUNTER — OFFICE VISIT (OUTPATIENT)
Facility: CLINIC | Age: 50
End: 2025-01-02
Payer: COMMERCIAL

## 2025-01-02 VITALS
OXYGEN SATURATION: 99 % | HEART RATE: 95 BPM | BODY MASS INDEX: 23.64 KG/M2 | HEIGHT: 68 IN | SYSTOLIC BLOOD PRESSURE: 128 MMHG | DIASTOLIC BLOOD PRESSURE: 86 MMHG | WEIGHT: 156 LBS

## 2025-01-02 DIAGNOSIS — Z00.00 ENCOUNTER FOR ROUTINE ADULT HEALTH EXAMINATION WITHOUT ABNORMAL FINDINGS: Primary | ICD-10-CM

## 2025-01-02 DIAGNOSIS — G57.92 ILIOINGUINAL NEURALGIA OF LEFT SIDE: ICD-10-CM

## 2025-01-02 DIAGNOSIS — E55.9 VITAMIN D DEFICIENCY: ICD-10-CM

## 2025-01-02 PROBLEM — R01.1 SYSTOLIC EJECTION MURMUR: Status: RESOLVED | Noted: 2017-08-08 | Resolved: 2025-01-02

## 2025-01-02 PROBLEM — R30.0 DYSURIA: Status: RESOLVED | Noted: 2022-08-11 | Resolved: 2025-01-02

## 2025-01-02 NOTE — PROGRESS NOTES
Herson Moya is a 49 year old male who presents for a complete physical exam.   HPI:     Concerns: Has had a cough off and on for the past few weeks. He was given Benzonatate, which does help. His cough is worse in the morning and at night. Taking Zyrtec once daily.   He has had chronic pelvic floor issues, and he is sick of going back and forth from the urologist to the physical therapist. He gets pain in his left groin and down his left leg frequently, and nothing seems to help with this.   Last colonoscopy:  4/2023 and normal   Last PSA: 10/2024 and 0.6   Diet/exercise: He drinks water and one cup of coffee a day. Breakfast is usually a bagel with cream cheese. Lunch is soup or salad, and dinner is usually salads or BBQ. Will walk or bike regularly for exercise.   Hx of STI screening: Yes and negative   Substance abuse: None  Vaccines- Tdap: 2023, Flu: Would like it today, Pneumovax: Declines, Shingles: Declines, Covid: Completed 3 doses    REVIEW OF SYSTEMS:   GENERAL: feels well otherwise, no fevers   SKIN: denies any unusual skin lesions  EYES:denies vision change  HEENT: no hearing changes  LUNGS: denies shortness of breath with exertion, acute cough   CARDIOVASCULAR: denies chest pain on exertion  GI: denies abdominal pain, constipation or diarrhea, no hematochezia   : denies nocturia or changes in stream  NEURO: denies headaches  PSYCHE: denies depression or anxiety    Current Outpatient Medications   Medication Sig Dispense Refill    Cholecalciferol (VITAMIN D3) 1.25 MG (84252 UT) Oral Tab Take 1 tablet by mouth once a week for 12 doses. 12 tablet 0    benzonatate 200 MG Oral Cap Take 1 capsule (200 mg total) by mouth 3 (three) times daily as needed for cough. 21 capsule 0      Past Medical History:    Allergic rhinitis    Priapism    Scoliosis    Urethral stricture      Past Surgical History:   Procedure Laterality Date    Back surgery  6/2002    Cystoscopy,dil urethral stricture  09/24/2008     cystoscopy/UD    Cystoscopy,dil urethral stricture  07/01/2014    [facility]    Cystourethroscopy  05/25/2012    cystCharu Hernandez    Cystourethroscopy  11/22/2013    cystoLatesha Hernandez    Cystourethroscopy  11/22/2016    ana luisa Hernandez    Cystourethroscopy  11/14/2017    cysthuy hernandez    Cystourethroscopy  04/03/2020    outside cystoscopy, 16fr thin bulbar stricture (No UD mentioned)    Neck/chest procedure unlisted  2000    Removal of a bone growth     Other surgical history      Spine fusion,poster,7-12 sgmts  2004    T2-T12 for thoracolumbar scoliosis       Family History   Problem Relation Age of Onset    Diabetes Father     Heart Disorder Father         CABG    Hypertension Father     Cancer Father         Lung    Heart Disorder Mother     Hypertension Mother     Heart Disease Maternal Grandmother     Heart Disease Maternal Grandfather     Stroke Paternal Grandmother     Heart Disease Paternal Grandmother     Stroke Paternal Grandfather     Heart Disease Paternal Grandfather     Colon Cancer Neg       Social History:  Social History     Socioeconomic History    Marital status: Single    Number of children: 0   Tobacco Use    Smoking status: Never    Smokeless tobacco: Never   Vaping Use    Vaping status: Never Used   Substance and Sexual Activity    Alcohol use: Yes     Alcohol/week: 2.0 standard drinks of alcohol     Types: 2 Cans of beer per week     Comment: social    Drug use: No    Sexual activity: Yes     Partners: Female   Other Topics Concern    Caffeine Concern No    Seat Belt Yes    Stress Concern No    Weight Concern Yes     Social Drivers of Health      Received from [facility], [facility]    Social Connections    Received from [facility], [facility]    Housing Stability      Occ: Works at [employer] and works in [employer]   : No Children: None        EXAM:     Wt Readings from Last 6 Encounters:   01/02/25 156 lb (70.8 kg)    09/10/24 157 lb (71.2 kg)   08/01/24 157 lb (71.2 kg)   07/08/24 160 lb (72.6 kg)   03/13/24 155 lb 9.6 oz (70.6 kg)   01/23/24 154 lb (69.9 kg)     Body mass index is 23.72 kg/m².    /86   Pulse 95   Ht 5' 8\" (1.727 m)   Wt 156 lb (70.8 kg)   SpO2 99%   BMI 23.72 kg/m²      GENERAL: well developed, well nourished,in no apparent distress  SKIN: no rashes,no suspicious lesions  HEENT: atraumatic, normocephalic  EYES: PERRLA, EOMI  NECK: supple,no adenopathy   LUNGS: clear to auscultation  CARDIO: RRR without murmur  GI: good bowel sounds,no masses, HSM or tenderness  EXTREMITIES: no cyanosis, clubbing or edema  NEURO: Oriented times three, cranial nerves are intact, motor and sensory are grossly intact    Cholesterol, Total (mg/dL)   Date Value   02/23/2022 165   08/04/2017 164     HDL Cholesterol (mg/dL)   Date Value   02/23/2022 61 (H)   08/04/2017 62     LDL Cholesterol (mg/dL)   Date Value   02/23/2022 86   08/04/2017 87     AST (U/L)   Date Value   02/23/2022 19   06/09/2020 19   11/24/2018 18     ALT (U/L)   Date Value   02/23/2022 47   06/09/2020 24   11/24/2018 31      ASSESSMENT AND PLAN:   Herson Moya is a 49 year old male who presents for a complete physical exam.    Encounter Diagnoses   Name Primary?    Encounter for routine adult health examination without abnormal findings Yes    Vitamin D deficiency     Ilioinguinal neuralgia of left side      Orders Placed This Encounter   Procedures    Vitamin D [E]    INFLUENZA VACCINE, TRI, PRESERV FREE, 0.5 ML     Will start once weekly, high dose vitamin d supplement due to deficiency and repeat vitamin d level in 12 weeks.   Referral to new urologist given to discuss chronic left groin pain that does not resolve despite multiple sessions of physical therapy.     Discussed with patient the following:  -Risks and benefits of screening for prostate cancer: Checked recently and normal  -Colon cancer screening   -Prevention of skin cancer  -Healthy  diet including adequate intake of vegetables and fruits, appropriate portion sizes, minimizing highly concentrated carbohydrate foods  -Exercising 30 minutes a day most days of the week   -Importance of regular exercise and weight maintenance  -Diabetes screening   -Cholesterol screening  -Recommendation for yearly influenza vaccine  -Need for Tdap once as an adult and Td booster every 10 years  -Need for Zoster vaccine for patients >= 50   -Contraception: partner methods, condoms, vasectomy  -STI screening (GC/Chlamydia/HIV) which he declines  -Hepatitis C screening for all adults between the ages of 18 and 79: Check in the future       Meds & Refills for this Visit:  Requested Prescriptions     Signed Prescriptions Disp Refills    Cholecalciferol (VITAMIN D3) 1.25 MG (43095 UT) Oral Tab 12 tablet 0     Sig: Take 1 tablet by mouth once a week for 12 doses.       Imaging & Consults:  INFLUENZA VACCINE, TRI, PRESERV FREE, 0.5 ML  UROLOGY - INTERNAL    Return in 1 year for annual physical or sooner as needed.     Carol Raymond DO  01/02/25   5:24 PM

## 2025-01-27 ENCOUNTER — OFFICE VISIT (OUTPATIENT)
Dept: INTERNAL MEDICINE CLINIC | Facility: CLINIC | Age: 50
End: 2025-01-27
Payer: COMMERCIAL

## 2025-01-27 VITALS
SYSTOLIC BLOOD PRESSURE: 134 MMHG | HEIGHT: 68 IN | HEART RATE: 103 BPM | BODY MASS INDEX: 23.95 KG/M2 | DIASTOLIC BLOOD PRESSURE: 86 MMHG | WEIGHT: 158 LBS

## 2025-01-27 DIAGNOSIS — L21.9: Primary | ICD-10-CM

## 2025-01-27 PROCEDURE — 99213 OFFICE O/P EST LOW 20 MIN: CPT | Performed by: NURSE PRACTITIONER

## 2025-01-27 PROCEDURE — 3008F BODY MASS INDEX DOCD: CPT | Performed by: NURSE PRACTITIONER

## 2025-01-27 PROCEDURE — 3079F DIAST BP 80-89 MM HG: CPT | Performed by: NURSE PRACTITIONER

## 2025-01-27 PROCEDURE — 3075F SYST BP GE 130 - 139MM HG: CPT | Performed by: NURSE PRACTITIONER

## 2025-01-27 RX ORDER — METHENAMINE, SODIUM PHOSPHATE, MONOBASIC, MONOHYDRATE, PHENYL SALICYLATE, METHYLENE BLUE, AND HYOSCYAMINE SULFATE 118; 40.8; 36; 10; .12 MG/1; MG/1; MG/1; MG/1; MG/1
1 CAPSULE ORAL 3 TIMES DAILY
COMMUNITY
Start: 2024-10-24 | End: 2025-01-27 | Stop reason: ALTCHOICE

## 2025-01-27 RX ORDER — ROFLUMILAST 1.5 MG/G
CREAM TOPICAL
COMMUNITY
Start: 2024-12-31

## 2025-01-27 RX ORDER — HYDROCORTISONE 25 MG/G
CREAM TOPICAL
COMMUNITY
Start: 2025-01-20

## 2025-01-27 NOTE — PATIENT INSTRUCTIONS
Use ketoconazole shampoo to wash your hair, scalp, and entire body once.    Let your skin dry completely    By thin layer of over-the-counter 1% hydrocortisone cream to your most irritated areas - may apply twice daily.  Let absorb for 3 to 5 minutes. Apply lotion like CeraVe over your entire body or to any place that feels extra dry.

## 2025-01-27 NOTE — PROGRESS NOTES
Subjective:   Herson Moya is a 49 year old male who presents for Rash (Neck, back c/o itchiness - started Thursday )     Reports itchiness behind his ears, on his back, neck, legs that began Thursday of last week. No burning, no pain, no vesicles.  No new soaps, detergents  No recent illness  Works in [employer]  Using Cerave at night that temporarily helps  Wondering if it's just dry skin or something else is wrong.  Started Thursday. States he has washed his sheets, wash his close, has been taking showers.  He has no idea what this discomfort.      History/Other:    Chief Complaint Reviewed and Verified  Nursing Notes Reviewed and   Verified  Tobacco Reviewed  Allergies Reviewed  Medications Reviewed    Problem List Reviewed  Medical History Reviewed  Surgical History   Reviewed  Family History Reviewed  Social History Reviewed         Tobacco:  He has never smoked tobacco.    Current Outpatient Medications   Medication Sig Dispense Refill    Ketoconazole 1 % External Shampoo Apply 1 Application topically twice a week. 1 application to entire body in the shower once. May repeat in 3 days for continued symptoms. 125 mL 0    Cholecalciferol (VITAMIN D3) 1.25 MG (57586 UT) Oral Tab Take 1 tablet by mouth once a week for 12 doses. 12 tablet 0    benzonatate 200 MG Oral Cap Take 1 capsule (200 mg total) by mouth 3 (three) times daily as needed for cough. 21 capsule 0    hydrocortisone 2.5 % External Cream APPLY TO AFFECTED AREA OF THE LEGS TWICE A DAY FOR 2 WEEKS. (Patient not taking: Reported on 1/27/2025)      ZORYVE 0.15 % External Cream Apply thin film TO affected AREAS ON FACE, trunk, AND extremities ONCE DAILY AS NEEDED. (Patient not taking: Reported on 1/27/2025)           Review of Systems:  Review of Systems  10 point review of systems otherwise negative with the exception of HPI and assessment and plan.    Objective:   /86   Pulse 103   Ht 5' 8\" (1.727 m)   Wt 158 lb (71.7 kg)   BMI 24.02 kg/m²   Estimated body mass index is 24.02 kg/m² as calculated from the following:    Height as of this encounter: 5' 8\" (1.727 m).    Weight as of this encounter: 158 lb (71.7 kg).      Physical Exam  Vitals reviewed.   Constitutional:       General: He is not in acute distress.     Appearance: He is not ill-appearing or toxic-appearing.   Skin:     General: Skin is warm and dry.      Capillary Refill: Capillary refill takes less than 2 seconds.      Comments: Scattered patches of mildly erythematous areas to back, neck, legs as pointed out by patient. No obvious rashes. Skin generally dry and flaky.   Neurological:      Mental Status: He is alert.         Assessment & Plan:   1. Generalized seborrheic dermatitis (Primary)  -     Ketoconazole; Apply 1 Application topically twice a week. 1 application to entire body in the shower once. May repeat in 3 days for continued symptoms.  Dispense: 125 mL; Refill: 0  - Seborrheic dermatitis versus generalized dry skin dermatitis.  Will treat with ketoconazole shampoo 1 application.  Recommended letting stingy skin dry completely, apply a thin layer of over-the-counter hydrocortisone cream to the areas that bother him the most.  Wait for that to absorb for 3 to 5 minutes, then apply CeraVe to his entire body.  May do hydrocortisone twice daily, CeraVe twice daily.  - May repeat ketoconazole shampoo after 3 days if he still not having any improvement.        No follow-ups on file.    MILE Sorto, 1/27/2025, 4:56 PM     This note was prepared using Dragon Medical voice recognition dictation software. As a result errors may occur. When identified, these errors have been corrected. While every attempt is made to correct errors during dictation discrepancies may still exist.

## 2025-02-02 ENCOUNTER — HOSPITAL ENCOUNTER (OUTPATIENT)
Age: 50
Discharge: HOME | End: 2025-02-02
Payer: COMMERCIAL

## 2025-02-02 VITALS
SYSTOLIC BLOOD PRESSURE: 120 MMHG | RESPIRATION RATE: 20 BRPM | TEMPERATURE: 98 F | DIASTOLIC BLOOD PRESSURE: 84 MMHG | OXYGEN SATURATION: 98 % | HEART RATE: 91 BPM

## 2025-02-02 DIAGNOSIS — M62.830 MUSCLE SPASM OF BACK: Primary | ICD-10-CM

## 2025-02-02 RX ORDER — NAPROXEN 500 MG/1
500 TABLET ORAL EVERY 12 HOURS PRN
Qty: 20 TABLET | Refills: 0 | Status: SHIPPED | OUTPATIENT
Start: 2025-02-02

## 2025-02-02 RX ORDER — LIDOCAINE 50 MG/G
1 PATCH TOPICAL EVERY 24 HOURS
Qty: 10 PATCH | Refills: 0 | Status: SHIPPED | OUTPATIENT
Start: 2025-02-02

## 2025-02-02 NOTE — ED INITIAL ASSESSMENT (HPI)
Patient states since Thursday he has had back spasms. Patient states he had rods placed in his back about 10 years ago and states with the weather changing it sometimes spasms. Patient reports that the back spasm usually go away within a day and he is still having them. Aleve has not helped patient. Patient complaining of upper back pain that radiates down both of his lower extremities.

## 2025-02-02 NOTE — ED PROVIDER NOTES
Patient Seen in: [facility]      History     Chief Complaint   Patient presents with    Back Pain     Spasms - Entered by patient     Stated Complaint: Back Pain - Spasms    Subjective:   49-year-old male with medical conditions as noted below presents with complaints of        11:21 Patient states since Thursday he has had back spasms. Patient states he had rods placed in his back about 10 years ago and states with the weather changing it sometimes spasms. Patient reports that the back spasm usually go away within a day and he is still having them. Aleve has not helped patient. Patient complaining of upper back pain that radiates down both of his lower extremities.                   Objective:     Past Medical History:    Allergic rhinitis    Priapism    Scoliosis    Urethral stricture              Past Surgical History:   Procedure Laterality Date    Back surgery  6/2002    Cystoscopy,dil urethral stricture  09/24/2008    cystoscopy/UD    Cystoscopy,dil urethral stricture  07/01/2014    [facility]    Cystourethroscopy  05/25/2012    cysto-Dr. Hernandez    Cystourethroscopy  11/22/2013    cysto-Dr. Hernandez    Cystourethroscopy  11/22/2016    cysto- Dr. Hernandez    Cystourethroscopy  11/14/2017    cysto - dr hernandez    Cystourethroscopy  04/03/2020    outside cystoscopy, 16fr thin bulbar stricture (No UD mentioned)    Neck/chest procedure unlisted  2000    Removal of a bone growth     Other surgical history      Spine fusion,poster,7-12 sgmts  2004    T2-T12 for thoracolumbar scoliosis                 No pertinent social history.            Review of Systems    Positive for stated complaint: Back Pain - Spasms  Other systems are as noted in HPI.  Constitutional and vital signs reviewed.      All other systems reviewed and negative except as noted above.    Physical Exam     ED Triage Vitals [02/02/25 1111]   /84   Pulse 91   Resp 20   Temp 97.9 °F (36.6 °C)   Temp src Oral   SpO2 98 %   O2 Device None (Room air)        Current Vitals:   Vital Signs  BP: 120/84  Pulse: 91  Resp: 20  Temp: 97.9 °F (36.6 °C)  Temp src: Oral    Oxygen Therapy  SpO2: 98 %  O2 Device: None (Room air)        Physical Exam        ED Course   Labs Reviewed - No data to display                MDM              Medical Decision Making      Disposition and Plan     Clinical Impression:  No diagnosis found.     Disposition:  There is no disposition on file for this visit.  There is no disposition time on file for this visit.    Follow-up:  No follow-up provider specified.        Medications Prescribed:  Current Discharge Medication List              Supplementary Documentation:

## 2025-02-10 ENCOUNTER — OFFICE VISIT (OUTPATIENT)
Dept: PHYSICAL MEDICINE AND REHAB | Facility: CLINIC | Age: 50
End: 2025-02-10
Payer: COMMERCIAL

## 2025-02-10 VITALS — BODY MASS INDEX: 23.95 KG/M2 | WEIGHT: 158 LBS | HEIGHT: 68 IN

## 2025-02-10 DIAGNOSIS — M47.816 FACET SYNDROME, LUMBAR: ICD-10-CM

## 2025-02-10 DIAGNOSIS — M25.551 BILATERAL HIP PAIN: Primary | ICD-10-CM

## 2025-02-10 DIAGNOSIS — M54.16 LUMBAR RADICULOPATHY: ICD-10-CM

## 2025-02-10 DIAGNOSIS — D16.9 OSTEOCHONDROMA: ICD-10-CM

## 2025-02-10 DIAGNOSIS — M48.061 LUMBAR FORAMINAL STENOSIS: ICD-10-CM

## 2025-02-10 DIAGNOSIS — M21.851 HIP DYSPLASIA, ACQUIRED, RIGHT: ICD-10-CM

## 2025-02-10 DIAGNOSIS — M79.10 MYALGIA: ICD-10-CM

## 2025-02-10 DIAGNOSIS — M41.25 OTHER IDIOPATHIC SCOLIOSIS, THORACOLUMBAR REGION: ICD-10-CM

## 2025-02-10 DIAGNOSIS — M51.369 BULGE OF LUMBAR DISC WITHOUT MYELOPATHY: ICD-10-CM

## 2025-02-10 DIAGNOSIS — M25.552 BILATERAL HIP PAIN: Primary | ICD-10-CM

## 2025-02-10 DIAGNOSIS — M54.59 MECHANICAL LOW BACK PAIN: ICD-10-CM

## 2025-02-10 DIAGNOSIS — M47.816 LUMBAR SPONDYLOSIS: ICD-10-CM

## 2025-02-10 PROCEDURE — 99214 OFFICE O/P EST MOD 30 MIN: CPT | Performed by: PHYSICAL MEDICINE & REHABILITATION

## 2025-02-10 PROCEDURE — 3008F BODY MASS INDEX DOCD: CPT | Performed by: PHYSICAL MEDICINE & REHABILITATION

## 2025-02-10 RX ORDER — DIAZEPAM 5 MG/1
TABLET ORAL
Qty: 2 TABLET | Refills: 0 | Status: SHIPPED | OUTPATIENT
Start: 2025-02-10

## 2025-02-10 NOTE — PATIENT INSTRUCTIONS
1) Please get X-rays of the Lumbar spine today on your way out.   ) Please call and schedule your MRI of the Lumbar spine and pelvis at [phone].  Once you have your MRI scheduled, then call my office again to schedule a follow-up visit soon after your MRI so we may review the images together.  3) Take valium 5 mg 30 minutes prior the appointment and a second tablet in the waiting room  4) Continue home exercise program  5) Continue Naprosyn 500 mg twice per day if needed  6) Lets touch base to review the MRI of the pelvis and lumbar spine.

## 2025-02-10 NOTE — PROGRESS NOTES
[facility]  Progress Note    CHIEF COMPLAINT:    Chief Complaint   Patient presents with    Follow - Up     LOV 9/10/24. F/U for bilateral hip pain. Pain varies (5-7/10) but weather is giving him back spasms. XR Bilateral Hips 10/29/24. Denies N/T, weakness. Pt takes naproxen prn, lidocaine patches prn with significant improvement. No recent PT, rare HEP.       History of Present Illness:  The patient is a 49 year old right-handed male with a significant past medical history of scoliosis status post T2-T12 fusion in 2004, urethral stricture currently followed by urology, and pelvic floor dysfunction. He has been seen by Dr. Angelita Arrington in the past and has also completed multiple sessions of pelvic floor physical therapy. More significantly, he has a history of osteochondromatosis. He has also been seen by Dr. Gil Jeffrey back in 2022.  He is coming in for follow-up of continued bilateral hip pain and also low back pain with radiation into the left leg down to the left medial foot.  He has had x-rays of the pelvis which I reviewed with him today.  He rates his pain anywhere from a 5-7 out of 10.  He has been taking naproxen as needed for pain.  He tried having an MRI performed although he could not due to pain in his back.  He denies any numbness and tingling or weakness.  He has not been in physical therapy and is occasionally doing a home exercise program.    PAST MEDICAL HISTORY:  Past Medical History:    Allergic rhinitis    Priapism    Scoliosis    Urethral stricture       SURGICAL HISTORY:  Past Surgical History:   Procedure Laterality Date    Back surgery  6/2002    Cystoscopy,dil urethral stricture  09/24/2008    cystoscopy/UD    Cystoscopy,dil urethral stricture  07/01/2014    Kaye    Cystourethroscopy  05/25/2012    normanoLatesha Restrepo    Cystourethroscopy  11/22/2013    cystoLatesha Restrepo    Cystourethroscopy  11/22/2016    cystoJamey Restrepo    Cystourethroscopy   11/14/2017    cysto - dr hernandez    Cystourethroscopy  04/03/2020    outside cystoscopy, 16fr thin bulbar stricture (No UD mentioned)    Neck/chest procedure unlisted  2000    Removal of a bone growth     Other surgical history      Spine fusion,poster,7-12 sgmts  2004    T2-T12 for thoracolumbar scoliosis        SOCIAL HISTORY:   Social History     Occupational History    Not on file   Tobacco Use    Smoking status: Never    Smokeless tobacco: Never   Vaping Use    Vaping status: Never Used   Substance and Sexual Activity    Alcohol use: Yes     Alcohol/week: 2.0 standard drinks of alcohol     Types: 2 Cans of beer per week     Comment: social    Drug use: No    Sexual activity: Yes     Partners: Female       FAMILY HISTORY:   Family History   Problem Relation Age of Onset    Diabetes Father     Heart Disorder Father         CABG    Hypertension Father     Cancer Father         Lung    Heart Disorder Mother     Hypertension Mother     Heart Disease Maternal Grandmother     Heart Disease Maternal Grandfather     Stroke Paternal Grandmother     Heart Disease Paternal Grandmother     Stroke Paternal Grandfather     Heart Disease Paternal Grandfather     Colon Cancer Neg        CURRENT MEDICATIONS:   Current Outpatient Medications   Medication Sig Dispense Refill    diazePAM 5 MG Oral Tab Take 1 tablet 30 minutes prior to MRI.  Take a second tablet in the waiting room. 2 tablet 0    naproxen 500 MG Oral Tab Take 1 tablet (500 mg total) by mouth every 12 (twelve) hours as needed. 20 tablet 0    lidocaine 5 % External Patch Place 1 patch onto the skin daily. 10 patch 0    hydrocortisone 2.5 % External Cream APPLY TO AFFECTED AREA OF THE LEGS TWICE A DAY FOR 2 WEEKS. (Patient not taking: Reported on 1/27/2025)      ZORYVE 0.15 % External Cream Apply thin film TO affected AREAS ON FACE, trunk, AND extremities ONCE DAILY AS NEEDED. (Patient not taking: Reported on 1/27/2025)      Ketoconazole 1 % External Shampoo Apply 1  Application topically twice a week. 1 application to entire body in the shower once. May repeat in 3 days for continued symptoms. 125 mL 0    Cholecalciferol (VITAMIN D3) 1.25 MG (92279 UT) Oral Tab Take 1 tablet by mouth once a week for 12 doses. 12 tablet 0    benzonatate 200 MG Oral Cap Take 1 capsule (200 mg total) by mouth 3 (three) times daily as needed for cough. 21 capsule 0       ALLERGIES:   Allergies[1]    REVIEW OF SYSTEMS:   Review of Systems   Constitutional: Negative.    HENT: Negative.    Eyes: Negative.    Respiratory: Negative.    Cardiovascular: Negative.    Gastrointestinal: Negative.    Genitourinary: Negative.    Musculoskeletal: As per HPI  Skin: Negative.    Neurological: As per HPI  Endo/Heme/Allergies: Negative.    Psychiatric/Behavioral: Negative.      All other systems reviewed and are negative. Pertinent positives and negatives noted in the HPI.        PHYSICAL EXAM:   Ht 68\"   Wt 158 lb (71.7 kg)   BMI 24.02 kg/m²     Body mass index is 24.02 kg/m².      General: No immediate distress  Head: Normocephalic/ Atraumatic  Eyes: Extra-occular movements intact.   Ears: No auricular hematoma or deformities  Mouth: No lesions or ulcerations  Heart: peripheral pulses intact. Normal capillary refill.   Lungs: Non-labored respirations  Abdomen: No abdominal guarding  Extremities: No lower extremity edema bilaterally   Skin: No lesions noted.   Cognition: alert & oriented x 3, attentive, able to follow 2 step commands, comprehension intact, spontaneous speech intact  Motor:    Musculoskeletal:    LUMBAR SPINE:  Inspection: no erythema, swelling, or obvious deformity.  Their iliac crest and shoulder heights are symmetrical.  Postural exam reveals  scoliosis  Palpation: Tender to palpation midline lumbar spine and left lower lumbar facet and paraspinal from L4 down to the base of the sacrum, bilateral gluteal muscles and piriformis  ROM: Full active range of motion of the lumbar spine  Motor: 5/5  in all myotomes of the BILATERAL lower extremities except 4 out of 5 during left great toe extension and plantarflexion  Sensation: Intact to light touch in all dermatomes of the lower extremities   Reflexes: 1/4 at L4 and S1  Facet Loading: no specific facet pain  Straight leg raise: negative for radicular pain symptoms  Slump test: negative for pain symptoms or radicular pain symptoms      Data  Office Visit on 01/02/2025   Component Date Value Ref Range Status    TOTAL VITAMIN D 10/21/2024 14.1   Final    TSH 10/21/2024 2.070  mIU/mL Final    WBC 10/21/2024 4.4  x10(3)uL Final    HGB 10/21/2024 14.6   Final    HCT 10/21/2024 45.7   Final    MCV 10/21/2024 88   Final    PLT 10/21/2024 235   Final    HgbA1C 10/21/2024 5.1  % Final    Cholesterol, Total 10/21/2024 165  mg/dL Final    HDL Cholesterol 10/21/2024 56  mg/dL Final    Direct LDL 10/21/2024 94  mg/dL Final    Triglycerides 10/21/2024 77  mg/dL Final    Chol/HDL Ratio 10/21/2024 2.90   Final    Glucose 10/21/2024 77  mg/dL Final    BUN 10/21/2024 18  mg/dL Final    Bilirubin, Total 10/21/2024 0.6  mg/dL Final    SODIUM 10/21/2024 142  mmol/L Final    POTASSIUM 10/21/2024 4.8  mmol/L Final    AST 10/21/2024 17  U/L Final    ALT 10/21/2024 27  U/L Final    CHLORIDE 10/21/2024 103   Final    CO2 10/21/2024 27   Final    CALCIUM 10/21/2024 3.5   Final    eGFR non- 10/21/2024 109   Final    TP 10/21/2024 0.17   Final    PSA Screen 10/21/2024 0.60  ng/mL Final   ]      Radiology Imaging:  I reviewed with the patient his X-ray of the pelvis bilateral   XR HIP W OR WO PELVIS(MIN 5 VIEWS),BILAT(CPT=73523)  Narrative: PROCEDURE: XR HIP W OR WO PELVIS 3 OR 4 VIEWS, BILAT (CPT=73522)     COMPARISON: [facility], CT ABD PELV W CONTRAST, 4/10/2009, 7:09 PM.  [facility], XR HIP W OR WO PELVIS 2 OR 3 VIEWS, RIGHT (CPT=73502), 11/29/2022, 11:58 AM.     INDICATIONS: Bilateral hip pain for years, no injury.      TECHNIQUE: AP upright pelvis, AP upright bilateral hip views were obtained.       FINDINGS:   BONES:  No acute fracture or dislocation.  Multifocal bony exostosis including right iliac crest measuring 4.7 cm in diameter visualized on prior imaging..  Chronic appearing deformity involving the bilateral femoral necks with broad-based bony exostoses   suspected contributing to the bilateral enlargement with parents unchanged from abdominal pelvic CT from 4/10/09.  Mild right hip osteoarthritic changes with partially uncovered right femoral head due in part to shallow acetabular angle suggesting mild   component of hip dysplasia.  Mild levoscoliosis lower lumbar spine.    SOFT TISSUES: Negative. No visible soft tissue swelling.   EFFUSION: None visible.   OTHER: Negative.               Impression: CONCLUSION:   1. No acute fracture or dislocation.  2. Multifocal bony exostoses redemonstrated including large exostosis arising from the right iliac crest.  3. Bilateral deformity involving the femoral necks and intertrochanteric regions due in part to the multifocal exostoses.  This appearance is unchanged from prior imaging.  4. Congenital hip dysplasia right hip with shallow acetabular angle.  Superimposed mild right hip osteoarthritic changes.           Dictated by (CST): Darío Hernandez MD on 10/30/2024 at 10:46 AM       Finalized by (CST): Darío Hernandez MD on 10/30/2024 at 10:52 AM              ASSESSMENT AND PLAN:  Herson is a pleasant 49-year-old male who presents for follow-up of his bilateral low back pain (left greater than right) with radiation into the left medial foot.  He does have a history of osteochondromatosis.  I have reviewed his x-rays of the pelvis.  I continue to recommend an MRI of the pelvis and have also ordered x-rays as well as an MRI of the lumbar spine.  He should use Valium for the MRI as he had muscle spasms and discomfort during the last trial of the MRI of the pelvis.  He should  continue his home exercise program and use Naprosyn.  I will touch base with him to review the MRI images and discuss the next steps.       RTC in 4 weeks for MRI review  Discharge Instructions were provided as documented in AVS summary.  The patient was in agreement with the assessment and plan.  All questions were answered.  There were no barriers to learning.         1. Bilateral hip pain    2. Other idiopathic scoliosis, thoracolumbar region    3. Osteochondroma    4. Myalgia    5. Lumbar radiculopathy    6. Facet syndrome, lumbar    7. Mechanical low back pain    8. Lumbar spondylosis    9. Lumbar foraminal stenosis    10. Bulge of lumbar disc without myelopathy    11. Hip dysplasia, acquired, right        Alex B. Behar MD  Physical Medicine and Rehabilitation/Sports Medicine  [address] Cures Act Notice to Patient: Medical documents like this are made available to patients in the interest of transparency. However, be advised this is a medical document and it is intended as [id number] communication between your medical providers. This medical document may contain abbreviations, assessments, medical data, and results or other terms that are unfamiliar. Medical documents are intended to carry relevant information, facts as evident, and the clinical opinion of the practitioner. As such, this medical document may be written in language that appears blunt or direct. You are encouraged to contact your medical provider and/or [facility] Experience if you have any questions about this medical document.        [1]   Allergies  Allergen Reactions    Barium Sulfate OTHER (SEE COMMENTS)    No Known Allergies HIVES    Peanut (Diagnostic) HIVES    Ciprofloxacin RASH    Penicillins RASH     Pt states he can take Amoxicillin, but Penicillin caused a rash    Doxycycline NAUSEA ONLY    Gel Base RASH     Gel used for ultrasound  Gel used for ultrasound

## 2025-02-18 ENCOUNTER — HOSPITAL ENCOUNTER (OUTPATIENT)
Dept: GENERAL RADIOLOGY | Facility: HOSPITAL | Age: 50
Discharge: HOME | End: 2025-02-18
Attending: PHYSICAL MEDICINE & REHABILITATION
Payer: COMMERCIAL

## 2025-02-18 ENCOUNTER — HOSPITAL ENCOUNTER (OUTPATIENT)
Dept: MRI IMAGING | Facility: HOSPITAL | Age: 50
Discharge: HOME | End: 2025-02-18
Attending: PHYSICAL MEDICINE & REHABILITATION
Payer: COMMERCIAL

## 2025-02-18 DIAGNOSIS — M25.552 BILATERAL HIP PAIN: ICD-10-CM

## 2025-02-18 DIAGNOSIS — D16.9 OSTEOCHONDROMA: ICD-10-CM

## 2025-02-18 DIAGNOSIS — M51.369 BULGE OF LUMBAR DISC WITHOUT MYELOPATHY: ICD-10-CM

## 2025-02-18 DIAGNOSIS — M79.10 MYALGIA: ICD-10-CM

## 2025-02-18 DIAGNOSIS — M54.16 LUMBAR RADICULOPATHY: ICD-10-CM

## 2025-02-18 DIAGNOSIS — M54.59 MECHANICAL LOW BACK PAIN: ICD-10-CM

## 2025-02-18 DIAGNOSIS — M47.816 FACET SYNDROME, LUMBAR: ICD-10-CM

## 2025-02-18 DIAGNOSIS — M25.551 BILATERAL HIP PAIN: ICD-10-CM

## 2025-02-18 DIAGNOSIS — M47.816 LUMBAR SPONDYLOSIS: ICD-10-CM

## 2025-02-18 DIAGNOSIS — M48.061 LUMBAR FORAMINAL STENOSIS: ICD-10-CM

## 2025-02-18 DIAGNOSIS — M41.25 OTHER IDIOPATHIC SCOLIOSIS, THORACOLUMBAR REGION: ICD-10-CM

## 2025-02-18 DIAGNOSIS — M21.851 HIP DYSPLASIA, ACQUIRED, RIGHT: ICD-10-CM

## 2025-02-18 PROCEDURE — 72148 MRI LUMBAR SPINE W/O DYE: CPT | Performed by: PHYSICAL MEDICINE & REHABILITATION

## 2025-02-18 PROCEDURE — 72110 X-RAY EXAM L-2 SPINE 4/>VWS: CPT | Performed by: PHYSICAL MEDICINE & REHABILITATION

## 2025-02-28 PROBLEM — M62.89 PELVIC FLOOR DYSFUNCTION: Status: ACTIVE | Noted: 2020-12-05

## 2025-02-28 PROBLEM — N52.02 CORPORO-VENOUS OCCLUSIVE ERECTILE DYSFUNCTION: Status: RESOLVED | Noted: 2017-05-09 | Resolved: 2025-02-28

## 2025-02-28 PROBLEM — R10.2: Status: RESOLVED | Noted: 2020-11-09 | Resolved: 2025-02-28

## 2025-02-28 PROBLEM — R33.9 INCOMPLETE BLADDER EMPTYING: Status: ACTIVE | Noted: 2020-12-05

## 2025-03-03 ENCOUNTER — OFFICE VISIT (OUTPATIENT)
Dept: INTERNAL MEDICINE CLINIC | Facility: CLINIC | Age: 50
End: 2025-03-03
Payer: COMMERCIAL

## 2025-03-03 VITALS
DIASTOLIC BLOOD PRESSURE: 86 MMHG | OXYGEN SATURATION: 99 % | HEIGHT: 68 IN | TEMPERATURE: 98 F | HEART RATE: 92 BPM | WEIGHT: 154.38 LBS | SYSTOLIC BLOOD PRESSURE: 124 MMHG | BODY MASS INDEX: 23.4 KG/M2

## 2025-03-03 DIAGNOSIS — M79.18 MUSCULOSKELETAL PAIN: Primary | ICD-10-CM

## 2025-03-03 PROCEDURE — 3079F DIAST BP 80-89 MM HG: CPT | Performed by: NURSE PRACTITIONER

## 2025-03-03 PROCEDURE — 3074F SYST BP LT 130 MM HG: CPT | Performed by: NURSE PRACTITIONER

## 2025-03-03 PROCEDURE — 3008F BODY MASS INDEX DOCD: CPT | Performed by: NURSE PRACTITIONER

## 2025-03-03 PROCEDURE — 99213 OFFICE O/P EST LOW 20 MIN: CPT | Performed by: NURSE PRACTITIONER

## 2025-03-03 RX ORDER — CYCLOBENZAPRINE HCL 10 MG
10 TABLET ORAL 2 TIMES DAILY PRN
Qty: 15 TABLET | Refills: 0 | Status: SHIPPED | OUTPATIENT
Start: 2025-03-03

## 2025-03-03 NOTE — PATIENT INSTRUCTIONS
Try naproxen twice daily with food for the next 5-7 days    Lidocaine patches to affected areas daily    Flexeril up to twice daily as needed - may cause drowsiness    Light stretching    If the pain continues, moves to your abdominal area, is accompanied by vomiting/diarrhea/fevers, you should be evaluated again

## 2025-03-03 NOTE — PROGRESS NOTES
Subjective:   Herson Moya is a 49 year old male with PMH scoliosis s/p T2-12 fusion, cervical DDD, chronic LBP, anxiety, pelvic floor dysfunction  who presents for Muscle Pain     Woke up maybe 10 days ago and feels like he pulled a muscle in bilat lower sides  The pain occurs with movement, happens once/twice an hour.  Pain comes and goes, 5-7/10  Subsides after a few minutes  No recent injury/trauma. Not in the same spot as his chronic hip pain.  No urinary symptoms or fevers. No abdominal pain, n/v/d.  No rash or skin change at the painful areas  Tried aleve didn't really help      History/Other:    Chief Complaint Reviewed and Verified  No Further Nursing Notes to   Review  Tobacco Reviewed  Allergies Reviewed  Medications Reviewed    Medical History Reviewed  Surgical History Reviewed  Family History   Reviewed  Social History Reviewed         Tobacco:  He has never smoked tobacco.    Current Outpatient Medications   Medication Sig Dispense Refill    cyclobenzaprine 10 MG Oral Tab Take 1 tablet (10 mg total) by mouth 2 (two) times daily as needed for Muscle spasms. 15 tablet 0    naproxen 500 MG Oral Tab Take 1 tablet (500 mg total) by mouth every 12 (twelve) hours as needed. 20 tablet 0    lidocaine 5 % External Patch Place 1 patch onto the skin daily. 10 patch 0    Ketoconazole 1 % External Shampoo Apply 1 Application topically twice a week. 1 application to entire body in the shower once. May repeat in 3 days for continued symptoms. 125 mL 0    Cholecalciferol (VITAMIN D3) 1.25 MG (88333 UT) Oral Tab Take 1 tablet by mouth once a week for 12 doses. 12 tablet 0    benzonatate 200 MG Oral Cap Take 1 capsule (200 mg total) by mouth 3 (three) times daily as needed for cough. 21 capsule 0         Review of Systems:  Review of Systems  10 point review of systems otherwise negative with the exception of HPI and assessment and plan.    Objective:   /86   Pulse 92   Temp 98.1 °F (36.7 °C) (Temporal)    Ht 5' 8\" (1.727 m)   Wt 154 lb 6.4 oz (70 kg)   SpO2 99%   BMI 23.48 kg/m²  Estimated body mass index is 23.48 kg/m² as calculated from the following:    Height as of this encounter: 5' 8\" (1.727 m).    Weight as of this encounter: 154 lb 6.4 oz (70 kg).      Physical Exam  Vitals reviewed.   Constitutional:       General: He is not in acute distress.  Cardiovascular:      Rate and Rhythm: Normal rate and regular rhythm.      Heart sounds: Normal heart sounds.   Pulmonary:      Effort: Pulmonary effort is normal. No respiratory distress.   Abdominal:      General: Bowel sounds are normal. There is no distension.      Palpations: Abdomen is soft. There is no hepatomegaly or splenomegaly.      Tenderness: There is no abdominal tenderness. There is no guarding.          Comments: Painful areas noted in red. No pain with palpation. No rash, erythema, mass.   Musculoskeletal:         General: No swelling. Normal range of motion.   Skin:     General: Skin is warm and dry.   Neurological:      Mental Status: He is alert.         Assessment & Plan:   1. Musculoskeletal pain (Primary)  -     Cyclobenzaprine HCl; Take 1 tablet (10 mg total) by mouth 2 (two) times daily as needed for Muscle spasms.  Dispense: 15 tablet; Refill: 0  - Unclear etiology, seems to be MS pain as it is primarily associated with movement. Try NSAIDs/naproxen twice daily with food, lidocaine patch to painful areas. Small number of flexeril as needed - states does not make him drowsy.  - If pain moves to abdomen and is accompanied by n/v/fever please return for evaluation.        Return if symptoms worsen or fail to improve.    MILE Sorto, 3/3/2025, 12:39 PM     This note was prepared using Dragon Medical voice recognition dictation software. As a result errors may occur. When identified, these errors have been corrected. While every attempt is made to correct errors during dictation discrepancies may still exist.

## 2025-04-03 ENCOUNTER — TELEMEDICINE (OUTPATIENT)
Dept: TELEHEALTH | Age: 50
End: 2025-04-03
Payer: COMMERCIAL

## 2025-04-03 DIAGNOSIS — J00 ACUTE RHINITIS: Primary | ICD-10-CM

## 2025-04-03 DIAGNOSIS — J31.0 CHRONIC RHINITIS: ICD-10-CM

## 2025-04-03 DIAGNOSIS — R05.1 ACUTE COUGH: ICD-10-CM

## 2025-04-03 PROCEDURE — 98005 SYNCH AUDIO-VIDEO EST LOW 20: CPT | Performed by: PHYSICIAN ASSISTANT

## 2025-04-03 RX ORDER — BENZONATATE 200 MG/1
200 CAPSULE ORAL 3 TIMES DAILY PRN
Qty: 30 CAPSULE | Refills: 0 | Status: SHIPPED | OUTPATIENT
Start: 2025-04-03

## 2025-04-03 RX ORDER — PREDNISONE 20 MG/1
40 TABLET ORAL DAILY
Qty: 6 TABLET | Refills: 0 | Status: SHIPPED | OUTPATIENT
Start: 2025-04-03 | End: 2025-04-06

## 2025-04-03 NOTE — PROGRESS NOTES
Virtual/Telephone Check-In    Herson Moya verbally consents to a Virtual/Telephone Check-In service on 04/03/25.  Patient has been referred to the Critical access hospital website at [email or web address] to review the yearly Consent to Treat document.  Patient understands and accepts financial responsibility for any deductible, co-insurance and/or co-pays associated with this service.       Telehealth Verbal Consent   I conducted a telehealth visit with Herson Moya today, 04/03/25, which was completed using two-way, real-time interactive audio and video communication. This has been done in good avis to provide continuity of care in the best interest of the provider-patient relationship, due to the COVID -19 public health crisis/national emergency where restrictions of face-to-face office visits are ongoing. Every conscious effort was taken to allow for sufficient and adequate time to complete the visit.  The patient was made aware of the limitations of the telehealth visit, including treatment limitations as no physical exam could be performed.  The patient was advised to call 911 or to go to the ER in case there was an emergency.  The patient was also advised of the potential privacy & security concerns related to the telehealth platform.   The patient was made aware of where to find Critical access hospital's notice of privacy practices, telehealth consent form and other related consent forms and documents.  which are located on the Critical access hospital website. The patient verbally agreed to telehealth consent form, related consents and the risks discussed.    Lastly, the patient confirmed that they were in [place].   Included in this visit, time may have been spent reviewing labs, medications, radiology tests and decision making. Appropriate medical decision-making and tests are ordered as detailed in the plan of care above.  Coding/billing information is submitted for this visit based on complexity of care and/or time spent for the visit.    CHIEF COMPLAINT:      Chief Complaint   Patient presents with    Allergies     Sneezing/coughing       HPI:   Herson Moya is a 50 year old male who presents for a video visit.  Patient reports ongoing sinus issues.  Reports last 3 days sinuses have been really bad, lots of congestion, and sneezing.  Reports periods of spasmodic cough. Took Sudafed today which does help with congestion temporarily. Had allergy testing years ago which gave vague results, but was positive for dust.  No fever, chills, nausea.  Using Benzonatate for cough which helps.  Saw ENT within last 4 weeks, exam overall benign outside of deviated septum.  Patient to get CT sinuses per ENT.  Has not yet done so, issues with scheduling.  Reports over the last several years he has done numerous nasal sprays and tried all OTC antihistamines without much relief in symptoms.     Current Outpatient Medications   Medication Sig Dispense Refill    cyclobenzaprine 10 MG Oral Tab Take 1 tablet (10 mg total) by mouth 2 (two) times daily as needed for Muscle spasms. 15 tablet 0    naproxen 500 MG Oral Tab Take 1 tablet (500 mg total) by mouth every 12 (twelve) hours as needed. 20 tablet 0    lidocaine 5 % External Patch Place 1 patch onto the skin daily. 10 patch 0      Past Medical History:    Allergic rhinitis    Priapism    Scoliosis    Urethral stricture      Past Surgical History:   Procedure Laterality Date    Back surgery  6/2002    Cystoscopy,dil urethral stricture  09/24/2008    cystoscopy/UD    Cystoscopy,dil urethral stricture  07/01/2014    Kaye    Cystourethroscopy  05/25/2012    cystoLatesha Hernandez    Cystourethroscopy  11/22/2013    cystoLatesha Hernandez    Cystourethroscopy  11/22/2016    cystoJamey Hernandez    Cystourethroscopy  11/14/2017    cysto - dr hernandez    Cystourethroscopy  04/03/2020    outside cystoscopy, 16fr thin bulbar stricture (No UD mentioned)    Neck/chest procedure unlisted  2000    Removal of a bone growth     Other surgical history      Spine fusion,poster,7-12  [facility]  2004    T2-T12 for thoracolumbar scoliosis          Social History     Socioeconomic History    Marital status: Single    Number of children: 0   Tobacco Use    Smoking status: Never    Smokeless tobacco: Never   Vaping Use    Vaping status: Never Used   Substance and Sexual Activity    Alcohol use: Yes     Alcohol/week: 2.0 standard drinks of alcohol     Types: 2 Cans of beer per week     Comment: social    Drug use: No    Sexual activity: Yes     Partners: Female   Other Topics Concern    Caffeine Concern No    Seat Belt Yes    Stress Concern No    Weight Concern Yes     Social Drivers of Health      [facility]    Housing Stability         REVIEW OF SYSTEMS:   GENERAL: no fever, chills, good po in take  SKIN: no rashes or abnormal skin lesions  HEENT: See HPI  LUNGS: denies shortness of breath or wheezing, See HPI      EXAM:   General: Alert, Well-appearing, and In no acute distress  Respiratory:   Speaking in full sentences comfortably  Normal work of breathing  Coughing during visit   Head: Normocephalic  Nose: No obvious nasal discharge but sounds nasally/congested, external nares are bright red from blowing nose  Skin: No other obvious rashes or lesions from what observed.     No results found for this or any previous visit (from the past 24 hours).    ASSESSMENT AND PLAN:   Herson Moya is a 50 year old male who presents with symptoms that are consistent with    ASSESSMENT:   Encounter Diagnoses   Name Primary?    Acute rhinitis Yes    Chronic rhinitis     Acute cough        PLAN: Acute on chronic rhinitis. Not overtly ill currently, suspect environmental trigger. Minimal response to oral antihistamines or nasal steroids in past.  Due to severity of sx currently will do short course of oral steroid.  Advised this is not something he should do frequently but will trial today. Will also trial Astepro nasal spray. Refill of benzonatate for  cough. Meds as below.  See patient Instructions    Meds & Refills for this Visit:  Requested Prescriptions     Signed Prescriptions Disp Refills    benzonatate 200 MG Oral Cap 30 capsule 0     Sig: Take 1 capsule (200 mg total) by mouth 3 (three) times daily as needed for cough.    predniSONE 20 MG Oral Tab 6 tablet 0     Sig: Take 2 tablets (40 mg total) by mouth daily for 3 days.    Azelastine HCl (ASTEPRO) 0.15 % Nasal Solution 1 each 0     Si spray by Nasal route 2 (two) times daily.       Risks, benefits, and side effects of medication explained and discussed.    The patient indicates understanding of these issues and agrees to the plan.  The patient is asked to return if sx's persist or worsen.    Face to face time spent on Video Visit: 8  Total Time spent on visit including reviewing history, ordering labs/medication, patient examination and education: 15    Herson Moya understands video visit evaluation is not a substitute for face-to-face examination or emergency care. Patient advised to go to ER or call 911 for worsening symptoms or acute distress.

## 2025-04-07 ENCOUNTER — OFFICE VISIT (OUTPATIENT)
Dept: PHYSICAL MEDICINE AND REHAB | Facility: CLINIC | Age: 50
End: 2025-04-07
Payer: COMMERCIAL

## 2025-04-07 VITALS
BODY MASS INDEX: 23.34 KG/M2 | SYSTOLIC BLOOD PRESSURE: 108 MMHG | OXYGEN SATURATION: 98 % | WEIGHT: 154 LBS | HEART RATE: 92 BPM | DIASTOLIC BLOOD PRESSURE: 64 MMHG | HEIGHT: 68 IN

## 2025-04-07 DIAGNOSIS — M79.10 MYALGIA: ICD-10-CM

## 2025-04-07 DIAGNOSIS — M21.851 HIP DYSPLASIA, ACQUIRED, RIGHT: ICD-10-CM

## 2025-04-07 DIAGNOSIS — M41.25 OTHER IDIOPATHIC SCOLIOSIS, THORACOLUMBAR REGION: ICD-10-CM

## 2025-04-07 DIAGNOSIS — M48.061 LUMBAR FORAMINAL STENOSIS: ICD-10-CM

## 2025-04-07 DIAGNOSIS — M25.551 BILATERAL HIP PAIN: Primary | ICD-10-CM

## 2025-04-07 DIAGNOSIS — M54.59 MECHANICAL LOW BACK PAIN: ICD-10-CM

## 2025-04-07 DIAGNOSIS — D16.9 OSTEOCHONDROMA: ICD-10-CM

## 2025-04-07 DIAGNOSIS — M54.16 LUMBAR RADICULOPATHY: ICD-10-CM

## 2025-04-07 DIAGNOSIS — M47.816 LUMBAR SPONDYLOSIS: ICD-10-CM

## 2025-04-07 DIAGNOSIS — M51.369 BULGE OF LUMBAR DISC WITHOUT MYELOPATHY: ICD-10-CM

## 2025-04-07 DIAGNOSIS — M47.816 FACET SYNDROME, LUMBAR: ICD-10-CM

## 2025-04-07 DIAGNOSIS — M25.552 BILATERAL HIP PAIN: Primary | ICD-10-CM

## 2025-04-07 PROCEDURE — 3008F BODY MASS INDEX DOCD: CPT | Performed by: PHYSICAL MEDICINE & REHABILITATION

## 2025-04-07 PROCEDURE — 3078F DIAST BP <80 MM HG: CPT | Performed by: PHYSICAL MEDICINE & REHABILITATION

## 2025-04-07 PROCEDURE — 99214 OFFICE O/P EST MOD 30 MIN: CPT | Performed by: PHYSICAL MEDICINE & REHABILITATION

## 2025-04-07 PROCEDURE — 3074F SYST BP LT 130 MM HG: CPT | Performed by: PHYSICAL MEDICINE & REHABILITATION

## 2025-04-07 RX ORDER — GABAPENTIN 100 MG/1
100 CAPSULE ORAL 3 TIMES DAILY
Qty: 90 CAPSULE | Refills: 0 | Status: SHIPPED | OUTPATIENT
Start: 2025-04-07

## 2025-04-07 RX ORDER — MELOXICAM 15 MG/1
15 TABLET ORAL DAILY
Qty: 30 TABLET | Refills: 1 | Status: SHIPPED | OUTPATIENT
Start: 2025-04-07

## 2025-04-07 NOTE — PROGRESS NOTES
The following individual(s) verbally consented to be recorded using ambient AI listening technology and understand that they can each withdraw their consent to this listening technology at any point by asking the clinician to turn off or pause the recording:    Patient name: Herson Merlosradha

## 2025-04-07 NOTE — PATIENT INSTRUCTIONS
1) Take Meloxicam 15 mg once per day with food for the next two weeks and then as needed but no more than 1 tablets per day. Do not take with any other NSAIDS (Ibuprofen, Advil, Aleve, Naprosyn etc). OK to take Tylenol 500 mg every 6 hours as needed for pain. If you develop any side effects including stomach aches, nausea, vomiting, or other gastrointestinal symptoms, stop the medication and call my office.   2) Please begin physical therapy as soon as possible. Ask for Brenda Lopez  [address], [place] [address]  [phone]  3) Tylenol 500-1000 mg every 6-8 hours as needed for pain.  No more than 3000 mg daily.  4) Start gabapentin 100 mg three times per day. If limited improvement, then would increase to 200 mg three times per day.  5) Follow-up with me in 6 to 8 weeks after you  begin physical therapy. If symptoms do not improve, then I would recommend a LEFT L5 and LEFt S1 TFESI

## 2025-04-07 NOTE — PROGRESS NOTES
[facility]  Progress Note    CHIEF COMPLAINT:    Chief Complaint   Patient presents with    Follow - Up     LOV 02/10/2025. Pt here for f/u presents with BL hip pain and to review imaging. Pain is 5/10. Denies N/T or weakness. Denies taking naprosyn, discontinued valium. Reports HEP. XR L-spine 2/18/25. MRI L-spine 2/18/25.       History of Present Illness  Herson Moya is a 50 year old male with scoliosis and disc herniations who presents with back pain radiating to the left leg.    He experiences significant back pain that radiates into his buttock and left leg. The pain is intermittent, with the worst episodes reaching an intensity of 8 to 9 out of 10. He manages the pain by 'mind over matter' and adjusting his position until it subsides. He has not sought emergency care for this pain.    The pain is associated with weakness, particularly in the left leg. He notes weakness in his big toe and difficulty pushing his foot down. The pain sometimes shoots down the leg, primarily on the left side, and he speculates that certain sleeping positions might exacerbate it.    He has a history of scoliosis and has been informed of disc herniations at the L4-L5 and L5-S1 levels on the left side, which are impinging on the nerve roots. He has not undergone surgery for this condition.    His current medication regimen includes Naprosyn, which he has tried previously without significant relief. He is not currently taking cyclobenzaprine, a muscle relaxer, as it has been discontinued from his medication list.       PAST MEDICAL HISTORY:  Past Medical History:    Allergic rhinitis    Priapism    Scoliosis    Urethral stricture       SURGICAL HISTORY:  Past Surgical History:   Procedure Laterality Date    Back surgery  6/2002    Cystoscopy,dil urethral stricture  09/24/2008    cystoscopy/UD    Cystoscopy,dil urethral stricture  07/01/2014    Kaye    Cystourethroscopy  05/25/2012     Tano Hernandez    Cystourethroscopy  11/22/2013    Tano Hernandez    Cystourethroscopy  11/22/2016    ana luisa Hernandez    Cystourethroscopy  11/14/2017    cysthuy hernandez    Cystourethroscopy  04/03/2020    outside cystoscopy, 16fr thin bulbar stricture (No UD mentioned)    Neck/chest procedure unlisted  2000    Removal of a bone growth     Other surgical history      Spine fusion,poster,7-12 sgmts  2004    T2-T12 for thoracolumbar scoliosis        SOCIAL HISTORY:   Social History     Occupational History    Not on file   Tobacco Use    Smoking status: Never    Smokeless tobacco: Never   Vaping Use    Vaping status: Never Used   Substance and Sexual Activity    Alcohol use: Yes     Alcohol/week: 2.0 standard drinks of alcohol     Types: 2 Cans of beer per week     Comment: social    Drug use: No    Sexual activity: Yes     Partners: Female       FAMILY HISTORY:   Family History   Problem Relation Age of Onset    Diabetes Father     Heart Disorder Father         CABG    Hypertension Father     Cancer Father         Lung    Heart Disorder Mother     Hypertension Mother     Heart Disease Maternal Grandmother     Heart Disease Maternal Grandfather     Stroke Paternal Grandmother     Heart Disease Paternal Grandmother     Stroke Paternal Grandfather     Heart Disease Paternal Grandfather     Colon Cancer Neg        CURRENT MEDICATIONS:   Current Outpatient Medications   Medication Sig Dispense Refill    Meloxicam 15 MG Oral Tab Take 1 tablet (15 mg total) by mouth daily. 30 tablet 1    gabapentin 100 MG Oral Cap Take 1 capsule (100 mg total) by mouth 3 (three) times daily. 90 capsule 0    benzonatate 200 MG Oral Cap Take 1 capsule (200 mg total) by mouth 3 (three) times daily as needed for cough. 30 capsule 0    Azelastine HCl (ASTEPRO) 0.15 % Nasal Solution 1 spray by Nasal route 2 (two) times daily. 1 each 0    lidocaine 5 % External Patch Place 1 patch onto the skin daily. 10 patch 0       ALLERGIES:    Allergies[1]    REVIEW OF SYSTEMS:   Review of Systems   Constitutional: Negative.    HENT: Negative.    Eyes: Negative.    Respiratory: Negative.    Cardiovascular: Negative.    Gastrointestinal: Negative.    Genitourinary: Negative.    Musculoskeletal: As per HPI  Skin: Negative.    Neurological: As per HPI  Endo/Heme/Allergies: Negative.    Psychiatric/Behavioral: Negative.      All other systems reviewed and are negative. Pertinent positives and negatives noted in the HPI.    PHYSICAL EXAM:   /64 (BP Location: Right arm, Patient Position: Sitting, Cuff Size: adult)   Pulse 92   Ht 68\"   Wt 154 lb (69.9 kg)   SpO2 98%   BMI 23.42 kg/m²     Body mass index is 23.42 kg/m².      General: No immediate distress  Head: Normocephalic/ Atraumatic  Eyes: Extra-occular movements intact.   Ears: No auricular hematoma or deformities  Mouth: No lesions or ulcerations  Heart: peripheral pulses intact. Normal capillary refill.   Lungs: Non-labored respirations  Abdomen: No abdominal guarding  Extremities: No lower extremity edema bilaterally   Skin: No lesions noted.   Cognition: alert & oriented x 3, attentive, able to follow 2 step commands, comprehension intact, spontaneous speech intact  Motor:    Musculoskeletal:  LUMBAR SPINE:  Inspection: no erythema, swelling, or obvious deformity.  Their iliac crest and shoulder heights are symmetrical.  Postural exam reveals  scoliosis  Palpation: Tender to palpation midline lumbar spine and left lower lumbar facet and paraspinal from L4 down to the base of the sacrum, bilateral gluteal muscles and piriformis  ROM: Full active range of motion of the lumbar spine  Motor: 5/5 in all myotomes of the BILATERAL lower extremities except 4 out of 5 during left great toe extension and plantarflexion  Sensation: Intact to light touch in all dermatomes of the lower extremities   Reflexes: 1/4 at L4 and S1  Facet Loading: no specific facet pain  Straight leg raise: negative for  radicular pain symptoms  Slump test: negative for pain symptoms or radicular pain symptoms      Data  Office Visit on 01/02/2025   Component Date Value Ref Range Status    TOTAL VITAMIN D 10/21/2024 14.1   Final    TSH 10/21/2024 2.070  mIU/mL Final    WBC 10/21/2024 4.4  x10(3)uL Final    HGB 10/21/2024 14.6   Final    HCT 10/21/2024 45.7   Final    MCV 10/21/2024 88   Final    PLT 10/21/2024 235   Final    HgbA1C 10/21/2024 5.1  % Final    Cholesterol, Total 10/21/2024 165  mg/dL Final    HDL Cholesterol 10/21/2024 56  mg/dL Final    Direct LDL 10/21/2024 94  mg/dL Final    Triglycerides 10/21/2024 77  mg/dL Final    Chol/HDL Ratio 10/21/2024 2.90   Final    Glucose 10/21/2024 77  mg/dL Final    BUN 10/21/2024 18  mg/dL Final    Bilirubin, Total 10/21/2024 0.6  mg/dL Final    SODIUM 10/21/2024 142  mmol/L Final    POTASSIUM 10/21/2024 4.8  mmol/L Final    AST 10/21/2024 17  U/L Final    ALT 10/21/2024 27  U/L Final    CHLORIDE 10/21/2024 103   Final    CO2 10/21/2024 27   Final    CALCIUM 10/21/2024 3.5   Final    eGFR non- 10/21/2024 109   Final    TP 10/21/2024 0.17   Final    PSA Screen 10/21/2024 0.60  ng/mL Final   ]      Radiology Imaging:  I reviewed with the patient his X-ray and MRI of the lumbar spine   XR LUMBAR SPINE AP LAT FLEX EXT (CPT=72110)  Narrative: PROCEDURE: XR LUMBAR SPINE AP LAT FLEX EXT EM (CPT=72120)     COMPARISON: [facility], XR LUMBAR SPINE AP LAT FLEX EXT (CPT=72110), 11/29/2022, 11:52 AM.     INDICATIONS: Lower back pain radiating distal to left leg x3 days. No known recent injury.     TECHNIQUE: Lumbar spine radiographs, 4 views (AP, lateral, flexion, and extension views)       FINDINGS:    ALIGNMENT: Preservation of the normal lumbar lordosis.  Slight dextroscoliosis.  VERTEBRAL BODIES:   Conventional 5 lumbar type vertebral bodies are identified.  No fracture.  Partially imaged extensive multilevel posterior thoracic fusion, which extends  inferiorly to T12.  The imaged portions of thoracic spine fusion hardware are   grossly intact.  DISC SPACES: Minor lower lumbar spine intervertebral disc height loss.  SACROILIAC JOINTS: Unremarkable.  FLEXION/EXTENSION: There is no evidence of provoked subluxation on dynamic views.   OTHER:   Negative.              Impression: CONCLUSION:   1. No evidence of instability on dynamic flexion/extension views.  2. Radiographically mild lower lumbar spine degenerative changes.  3. Partially imaged extensive posterior thoracic fusion.  4. Slight dextroscoliosis of the lumbar spine.        elm-remote     Dictated by (CST): Devyn Jaime MD on 2/21/2025 at 1:59 PM       Finalized by (CST): Devyn Jaime MD on 2/21/2025 at 2:01 PM          MRI SPINE LUMBAR (CPT=72148)  Narrative: PROCEDURE: MRI SPINE LUMBAR (CPT=72148)     COMPARISON: [facility], XR LUMBAR SPINE AP LAT FLEX EXT (CPT=72110), 2/18/2025, 5:25 PM.     INDICATIONS: M21.851 Hip dysplasia, acquired, right M51.369 Bulge of lumbar disc without myelopathy M48.061 Lumbar foraminal stenosis M47.816 Lumbar spondylosis M54.59 [facility]*     TECHNIQUE: A variety of imaging planes and parameters were utilized for visualization of suspected pathology.     FINDINGS:   NUMERATION: For the purposes of this examination, the lowest fully formed disc space is designated as L5-S1.  ALIGNMENT: There is preservation of the expected lumbar lordosis.  Slight dextroscoliosis.  BONES: No acute lumbar spine fracture.  Susceptibility artifacts from lower thoracic fusion at the edge of the field of view.  CORD/CAUDA EQUINA: The distal cord and nerve roots have normal caliber, contour, and signal intensity.  PARASPINAL AREA: No visible mass.    OTHER: Small simple-right renal cyst.     LUMBAR DISC LEVELS:  L1-L2: No significant disc/facet abnormality, spinal stenosis, or foraminal stenosis.    L2-L3: No significant disc/facet abnormality, spinal stenosis, or  foraminal stenosis.    L3-L4: No significant disc/facet abnormality, spinal stenosis, or foraminal stenosis.    L4-L5: Small diffuse disc bulge with superimposed left foraminal zone disc protrusion, which results in moderate left neural foraminal stenosis and effacement of the exiting left L4 nerve root.  There is also minor left lateral recess stenosis, but with   no spinal canal or right neural foraminal compromise.  L5-S1: Small diffuse disc bulge with mild bilateral facet arthropathy and ligamentum flavum redundancy.  Mild-to-moderate right lateral recess and mild bilateral neural foraminal stenosis.  No significant spinal canal left lateral recess compromise.    Small synovial cysts arise from both facet joints; these include a tiny 4 mm anteriorly directed synovial cyst that arises from the left facet joint and partially extends into the left neural foramen contributing to aforementioned left foraminal   stenosis.              Impression: CONCLUSION:      1. Multilevel degenerative changes of the lumbar spine as detailed. Notable levels as follows:     2. L4-L5:  Left foraminal zone disc protrusion, which results in moderate left neural foraminal stenosis and effacement of the exiting L4 root.  Please correlate for left L4 radiculopathy.  There is also minor left lateral recess stenosis.  3. L5-S1:  Mild-to-moderate right lateral recess with mild bilateral neural foraminal stenosis.  There are small synovial cyst that arises from both facet joints.  These include a small 4 mm anteriorly directed synovial cyst that arises from the left   facet joint and extends toward the left neural foramen, contributing to aforementioned left neural foraminal stenosis.     4. Slight dextroscoliosis of the lumbar spine.     5. Partially imaged susceptibility artifacts from lower thoracic fusion at the edge of the field of view.     elm-remote     Dictated by (CST): Devyn Jaime MD on 2/21/2025 at 8:22 AM       Finalized by  (CST): Devyn Jaime MD on 2/21/2025 at 8:28 AM              ASSESSMENT AND PLAN:  Herson is a pleasant 50-year-old male who presents with left-sided low back pain radiating down the left leg due to a lumbar radiculopathy.  He has had an MRI of the lumbar spine performed he does have neuroforaminal narrowing at L4-L5 and L5-S1 leading to compression of the L5 and S1 nerve roots.  I am recommending he start formal physical therapy, meloxicam, Tylenol, and gabapentin.  He will follow-up with me in about 2 months.  If his symptoms continue, then I would recommend a left L5 and left S1 transforaminal epidural steroid injection.     Assessment & Plan  Lumbar disc herniation with radiculopathy  Lumbar disc herniations at L4-L5 and L5-S1 on the left cause radiculopathy with pain radiating into the buttock and left leg. Weakness correlates with MRI findings. Condition exacerbated by scoliosis, degeneration, and altered mechanics due to hip issues. Pain severity 8-9/10, managed with mental strategies and adjustments. Conservative management preferred over surgery. Explained risks of epidural steroid injection.  - Prescribe meloxicam 15 mg once daily for two weeks, then as needed.  - Prescribe gabapentin starting at a low dose three times a day, with option to increase to two tablets three times a day if tolerated.  - Initiate physical therapy focusing on core and pelvic strengthening, preferably at clinic where his spine and hip can be managed by experienced therapists.  - Consider left L5 and left S1 transforaminal epidural steroid injection if symptoms do not improve with conservative management.  - Advise use of ice for symptomatic relief.    Hip dysplasia, acquired, right  Hip dysplasia contributes to altered mechanics, exacerbating lumbar spine issues. Limits mobility and may affect efficacy of physical therapy.  - Incorporate hip mobility and strengthening exercises into physical therapy regimen.    Other idiopathic  scoliosis, thoracolumbar region  Scoliosis contributes to lumbar disc herniation and radiculopathy. Alters spinal mechanics and may complicate treatment.  - Monitor scoliosis progression and its impact on lumbar spine health during follow-up visits.    Follow-up  Follow-up necessary to assess treatment effectiveness and determine need for further interventions.  - Schedule follow-up appointment in two months to evaluate treatment progress and consider further interventions if needed.      RTC in 2 months  Discharge Instructions were provided as documented in AVS summary.  The patient was in agreement with the assessment and plan.  All questions were answered.  There were no barriers to learning.         1. Bilateral hip pain    2. Other idiopathic scoliosis, thoracolumbar region    3. Osteochondroma    4. Myalgia    5. Lumbar radiculopathy    6. Facet syndrome, lumbar    7. Mechanical low back pain    8. Lumbar spondylosis    9. Lumbar foraminal stenosis    10. Bulge of lumbar disc without myelopathy    11. Hip dysplasia, acquired, right        Alex B. Behar MD  Physical Medicine and Rehabilitation/Sports Medicine  [address] Cures Act Notice to Patient: Medical documents like this are made available to patients in the interest of transparency. However, be advised this is a medical document and it is intended as [id number] communication between your medical providers. This medical document may contain abbreviations, assessments, medical data, and results or other terms that are unfamiliar. Medical documents are intended to carry relevant information, facts as evident, and the clinical opinion of the practitioner. As such, this medical document may be written in language that appears blunt or direct. You are encouraged to contact your medical provider and/or [facility] Experience if you have any questions about this medical document.   Abridge tool was used for  dictation purposes only and the patient was not recorded at any point during the visit.              [1]   Allergies  Allergen Reactions    Barium Sulfate OTHER (SEE COMMENTS)    No Known Allergies HIVES    Peanut (Diagnostic) HIVES    Peanut Oil HIVES     peanut oil    Ciprofloxacin RASH    Penicillins RASH     Pt states he can take Amoxicillin, but Penicillin caused a rash    Doxycycline NAUSEA ONLY    Gel Base RASH     Gel used for ultrasound  Gel used for ultrasound

## 2025-04-08 ENCOUNTER — TELEPHONE (OUTPATIENT)
Dept: PHYSICAL THERAPY | Age: 50
End: 2025-04-08

## 2025-04-15 ENCOUNTER — TELEPHONE (OUTPATIENT)
Dept: PHYSICAL THERAPY | Age: 50
End: 2025-04-15

## 2025-04-27 ENCOUNTER — HOSPITAL ENCOUNTER (OUTPATIENT)
Age: 50
Discharge: HOME | End: 2025-04-27
Payer: COMMERCIAL

## 2025-04-27 VITALS
RESPIRATION RATE: 20 BRPM | HEART RATE: 94 BPM | TEMPERATURE: 98 F | OXYGEN SATURATION: 100 % | SYSTOLIC BLOOD PRESSURE: 140 MMHG | DIASTOLIC BLOOD PRESSURE: 82 MMHG

## 2025-04-27 DIAGNOSIS — T50.Z95A VACCINE REACTION, INITIAL ENCOUNTER: Primary | ICD-10-CM

## 2025-04-27 PROCEDURE — 99213 OFFICE O/P EST LOW 20 MIN: CPT | Performed by: NURSE PRACTITIONER

## 2025-04-27 NOTE — ED PROVIDER NOTES
Patient Seen in: [facility]      History     Chief Complaint   Patient presents with    Rash     On arm - Entered by patient     Stated Complaint: Rash - On arm    Subjective:   Well-appearing 50-year-old male with priapism, cervical degenerative disc disease and vitamin D deficiency presents with complaints of a rash, warmth and tenderness to his left upper arm that occurred 2 days after receiving his pneumococcal vaccine on April 21, 2025.  Patient communicates that today morning rash appeared larger which prompted IC eval.  Patient denies fever or chills.  Patient denies extremity numbness or weakness, loss of sensation, decreased range of motion.  Patient denies red streaking, wound drainage.                Objective:     Past Medical History:    Allergic rhinitis    Priapism    Scoliosis    Urethral stricture              Past Surgical History:   Procedure Laterality Date    Back surgery  6/2002    Cystoscopy,dil urethral stricture  09/24/2008    cystoscopy/UD    Cystoscopy,dil urethral stricture  07/01/2014    Kaye    Cystourethroscopy  05/25/2012    cysto-Dr. Hernandez    Cystourethroscopy  11/22/2013    cysto-Dr. Hernandez    Cystourethroscopy  11/22/2016    cysto- Dr. Hernandez    Cystourethroscopy  11/14/2017    cysto - dr hernandez    Cystourethroscopy  04/03/2020    outside cystoscopy, 16fr thin bulbar stricture (No UD mentioned)    Neck/chest procedure unlisted  2000    Removal of a bone growth     Other surgical history      Spine fusion,poster,7-12 sgmts  2004    T2-T12 for thoracolumbar scoliosis                 Social History     Socioeconomic History    Marital status: Single    Number of children: 0   Tobacco Use    Smoking status: Never    Smokeless tobacco: Never   Vaping Use    Vaping status: Never Used   Substance and Sexual Activity    Alcohol use: Yes     Alcohol/week: 2.0 standard drinks of alcohol     Types: 2 Cans of beer per week     Comment: social    Drug use: No    Sexual activity: Yes      Partners: Female   Other Topics Concern    Caffeine Concern No    Seat Belt Yes    Stress Concern No    Weight Concern Yes     Social Drivers of Health     Food Insecurity: Low Risk  (4/21/2025)    Received from [facility]    Food Insecurity     Have there been times that your food ran out, and you didn't have money to get more?: No     Are there times that you worry that this might happen?: No   Transportation Needs: Low Risk  (4/21/2025)    Received from [facility]    Transportation Needs     Do you have trouble getting transportation to medical appointments?: No     How do you normally get to and from your appointments?: Family/Friend              Review of Systems    Positive for stated complaint: Rash - On arm  Other systems are as noted in HPI.  Constitutional and vital signs reviewed.      All other systems reviewed and negative except as noted above.      Physical Exam     ED Triage Vitals [04/27/25 1032]   /82   Pulse 94   Resp 20   Temp 98 °F (36.7 °C)   Temp src Oral   SpO2 100 %   O2 Device None (Room air)       Current Vitals:   Vital Signs  BP: 140/82  Pulse: 94  Resp: 20  Temp: 98 °F (36.7 °C)  Temp src: Oral    Oxygen Therapy  SpO2: 100 %  O2 Device: None (Room air)        Physical Exam  VS: Vital signs reviewed. 02 saturation within normal limits for this patient.    General: Patient is awake and alert, oriented to person, place and time. Pt appears non-toxic.     HEENT: Head is normocephalic, atraumatic. Nonicteric sclera, no conjunctival injection. No facial droop or slurred speech. No oral lesions or pallor. Mucous membranes moist.     Neck: Supple. Normal ROM.    Lungs: Good inspiratory effort. No accessory muscle use or tachypnea.    Extremities: Capillary refill noted.      Left shoulder: Normal.      Left upper arm: Tenderness, swelling and erythema present to left upper arm.  No wound drainage or red streaking present.  Rash is blanchable.   No swelling, edema, deformity, lacerations or bony tenderness.      Left elbow: Normal.     Skin: Warm, dry and normal in color.     Psychiatric: Normal affect, judgement normal, insight normal.     CNS: Moves all 4 extremities. Interacts appropriately. No gait abnormality. Memory normal.    ED Course   Labs Reviewed - No data to display     MDM   Medical Decision Making  Well-appearing.  Differential diagnosis discussed and considered included cellulitis versus erysipelas versus delayed vaccine reaction versus upper extremity venous thrombosis.  I discussed cool compresses 3-4 times daily for 10 to 15 minutes each.  I also discussed over-the-counter acetaminophen and or NSAIDs as needed for pain or discomfort.  PMD follow-up.  Return precautions discussed.    Office visit chart from April 21, 2025 reviewed.  Pneumococcal vaccine given on this date.    Problems Addressed:  Vaccine reaction, initial encounter: acute illness or injury    Amount and/or Complexity of Data Reviewed  External Data Reviewed: notes.    Risk  OTC drugs.        Disposition and Plan     Clinical Impression:  1. Vaccine reaction, initial encounter         Disposition:  Discharge  4/27/2025 10:52 am    Follow-up:  Carol Raymond DO  [address] [address]  [phone]      As needed          Medications Prescribed:  Discharge Medication List as of 4/27/2025 10:58 AM          Supplementary Documentation:

## 2025-06-24 NOTE — PROGRESS NOTES
[facility]  Initial Office Consultation    HPI:   Herson Moya is a 50 year old male with PMHx of priapism and uretheral stricture here today to for burning with urination that has been ongoing for over a year and a split stream.      Dip UA Negative    Pt was last seen in this office 6/5/2024 with Dr. Jules for dysuria with recommendation to try pelvic floor therapy and flomax.     Pt followed up with other urologists and had a cystoscopy 11/2024 11/5/2024 Last cystoscopy  resulting fixed distal bulbar urethral stricture that accommodates the caliber of the flexible cystoscope with minimal direct pressure/passage of scope. No formal urethral dilators needed. Prostate / Pelvic: Normal. Bladder: Normal. No tumor, stone, diverticulum, or glomerulation. U.O's: Normal. Trabeculation: 1+  Recommendations: Formal URETHRAL DILATION not needed and plan currently to continue with PFPT    3/18/2025 for dysuria and trailed pelvic floor therapy, pt states it did not improve sx. Dr. Weems recommended a cystoscopy for reoccurring stricture.     4/1/2025 for erectile dysfunction, previously trial both tadalafil and sildenafil     Pt states that he does not want to repeat the cystoscopy, has tried pelvic floor therapy, AZO, Uriberl and Flomax and none of it relieved his symptoms.     Past Medical History[1]  Past Surgical History[2]  Family History[3]  Short Social Hx on File[4]  Current Medications[5]    Allergies: Barium sulfate, No known allergies, Peanut (diagnostic), Peanut oil, Ciprofloxacin, Penicillins, Doxycycline, and Gel base    REVIEW OF SYSTEMS:  Review of Systems   Constitutional: Negative.    HENT: Negative.     Eyes: Negative.    Respiratory: Negative.     Cardiovascular: Negative.    Gastrointestinal: Negative.  Negative for constipation.   Genitourinary:  Positive for difficulty urinating and dysuria. Negative for frequency, hematuria, penile pain, penile swelling, testicular pain and urgency.    Musculoskeletal: Negative.    Skin: Negative.    Neurological: Negative.    Hematological: Negative.    Psychiatric/Behavioral: Negative.           EXAM:  There were no vitals taken for this visit.    Physical Exam  Constitutional:       Appearance: Normal appearance. He is normal weight.   Eyes:      Pupils: Pupils are equal, round, and reactive to light.   Pulmonary:      Effort: Pulmonary effort is normal.   Abdominal:      General: Abdomen is flat.      Palpations: Abdomen is soft.      Hernia: There is no hernia in the left inguinal area or right inguinal area.   Genitourinary:     Pubic Area: No rash.       Penis: Normal and circumcised. No phimosis, tenderness, discharge, swelling or lesions.       Testes: Normal.      Epididymis:      Right: Normal.      Left: Normal.   Musculoskeletal:         General: Normal range of motion.   Skin:     General: Skin is warm and dry.   Neurological:      General: No focal deficit present.      Mental Status: He is alert and oriented to person, place, and time. Mental status is at baseline.   Psychiatric:         Mood and Affect: Mood normal.         Behavior: Behavior normal.         Thought Content: Thought content normal.          LABS:  No results found for: \"PSA\", \"QPSA\", \"TOTPSASCREEN\"  Lab Results   Component Value Date    WBC 4.4 10/21/2024    RBC 5.02 02/23/2022    HGB 14.6 10/21/2024    HCT 45.7 10/21/2024    MCV 88 10/21/2024    MCH 28.5 02/23/2022    MCHC 32.6 02/23/2022    RDW 12.7 02/23/2022     10/21/2024    MPV 8.0 01/10/2019     Lab Results   Component Value Date    GLU 77 10/21/2024    BUN 18 10/21/2024    BUNCREA 29.2 (H) 02/23/2022    CREATSERUM 0.89 02/23/2022    ANIONGAP 3 02/23/2022    GFRNAA 109 10/21/2024    GFRAA 118 02/23/2022    CA 3.5 10/21/2024     02/23/2022    K 4.8 10/21/2024     10/21/2024    CO2 27 10/21/2024     No results found for: \"PTP\", \"PT\", \"INR\"    IMAGING:  ULTRASOUND GROIN LEFT LIMITED (CPT=76882)      COMPARISON: None.     INDICATIONS: Left groin pain     TECHNIQUE: Grayscale and power Doppler sonography was performed of the left inguinal region.     FINDINGS: Targeted sonography was performed of the palpable abnormality located within the left groin.     Within the left inguinal region, lateral to the inferior epigastric arteries is herniation of a small amount of pelvic fat which is accentuated with Valsalva maneuver.  This is seen anterior to the spermatic cord.  There is no bowel herniation.     More inferiorly, there is a complex cyst of the epididymal head measuring 3.3 x 1.7 x 2.8 cm which corresponds to patient's painful and palpable abnormality.  A complete scrotal exam was not done.  There was nondiagnostic evaluation of the testicles.                 Impression   CONCLUSION:     Small reducible left indirect inguinal hernia containing pelvic fat.  No bowel herniation.     3.3 cm complex epididymal head cyst corresponding to patient's palpable and painful abnormality.  If clinically indicated, nonemergent urologic evaluation and a nonemergent dedicated scrotal ultrasound can further characterize.             Dictated by (CST): Ricki Copeland MD on 6/30/2022 at 10:13 AM      Finalized by (CST): Ricki Copeland MD on 6/30/2022 at 10:15 AM         Narrative   PROCEDURE: US SCROTUM W/DOPPLER (CPT=93975/45170)     COMPARISON: None.     INDICATIONS: Left testicular pain     TECHNIQUE: The scrotum was evaluated with gray scale and color duplex Doppler sonography.     FINDINGS:     RIGHT  TESTICLE: 2.3 x 3.0 x 5.0 cm.  Normal echogenicity.  No masses.    EPIDIDYMIS: Normal size and echogenicity. 4 mm cyst projects in the head of the epididymis.    OTHER: There is a small hydrocele. There is a moderate varicocele along the posterior testicle measuring up to 5 mm in diameter. 3 mm calcification also projects within the scrotal sac posteriorly.  DOPPLER: Normal arterial and venous flow in the testicle with color and  pulsed Doppler.  Normal epididymal flow.       LEFT  TESTICLE: 4.8 x 2.4 x 3.3 cm.  Normal echogenicity.  No masses.    EPIDIDYMIS: 2.3 x 1.6 x 2.6 cm cyst projects in the head of the epididymis. The epididymis otherwise appears normal.  OTHER: Minimal left hydrocele. Moderate left varicocele measuring up to 7 mm in diameter.  DOPPLER: Normal arterial and venous flow in the testicle with color pulsed Doppler.  Normal epididymal flow.        Impression   CONCLUSION:  1. Normal appearance of the testicles.  2. Cyst in the head of the epididymis bilaterally, larger on the left.  3. Bilateral varicoceles.  4. Bilateral hydroceles, greater on the right.              Dictated by (CST): Yue Robertson MD on 3/27/2018 at 17:19      Approved by (CST): Yue Robertson MD on 3/27/2018 at 17:23           IMPRESSION:      Dysuria and split stream    Urine culture sent to r/o infection - Dip UA Negative  Discussed recommendation for cystoscopy pt has received from other urologists to reassess urethral stricture, pt will schedule the appointment and think about it.     Behavioral management includes timed voiding, double voiding, avoiding bladder irritants (coffee, tea, soda, alcohol), sitz baths, meditation 10 minutes every day, constipation avoidance, voiding prior to bedtime, avoiding fluids 2 to 4 hours before bedtime, compression stockings. Pt states he has been working on these behaviors for a year      Tamsulosin will help relax the pelvic floor muscles.  It should be taken at night as it can cause some dizziness.  It also can cause decreased ejaculate volume and should be stopped if he is trying to conceive. Pt refused and states that he tried it in the past and it did not work         PLAN:  Urine culture  Cystoscopy    MILE Harris  6/24/2025 1:43 PM         [1]   Past Medical History:   Allergic rhinitis    Priapism    Scoliosis    Urethral stricture   [2]   Past Surgical History:  Procedure  Laterality Date    Back surgery  6/2002    Cystoscopy,dil urethral stricture  09/24/2008    cystoscopy/UD    Cystoscopy,dil urethral stricture  07/01/2014    [facility]    Cystourethroscopy  05/25/2012    cystoLatesha Hernandez    Cystourethroscopy  11/22/2013    cystoLatesha Hernandez    Cystourethroscopy  11/22/2016    cystoJamey Hernandez    Cystourethroscopy  11/14/2017    cysto Jamey hernandez    Cystourethroscopy  04/03/2020    outside cystoscopy, 16fr thin bulbar stricture (No UD mentioned)    Neck/chest procedure unlisted  2000    Removal of a bone growth     Other surgical history      Spine fusion,poster,7-12 sgmts  2004    T2-T12 for thoracolumbar scoliosis    [3]   Family History  Problem Relation Age of Onset    Diabetes Father     Heart Disorder Father         CABG    Hypertension Father     Cancer Father         Lung    Heart Disorder Mother     Hypertension Mother     Heart Disease Maternal Grandmother     Heart Disease Maternal Grandfather     Stroke Paternal Grandmother     Heart Disease Paternal Grandmother     Stroke Paternal Grandfather     Heart Disease Paternal Grandfather     Colon Cancer Neg    [4]   Social History  Socioeconomic History    Marital status: Single    Number of children: 0   Tobacco Use    Smoking status: Never    Smokeless tobacco: Never   Vaping Use    Vaping status: Never Used   Substance and Sexual Activity    Alcohol use: Yes     Alcohol/week: 2.0 standard drinks of alcohol     Types: 2 Cans of beer per week     Comment: social    Drug use: No    Sexual activity: Yes     Partners: Female   Other Topics Concern    Caffeine Concern No    Seat Belt Yes    Stress Concern No    Weight Concern Yes     Social Drivers of Health     Food Insecurity: Low Risk  (4/21/2025)    Received from [facility]    Food Insecurity     Have there been times that your food ran out, and you didn't have money to get more?: No     Are there times that you worry that this might happen?: No   Transportation Needs:  Low Risk  (4/21/2025)    Received from [facility]    Transportation Needs     Do you have trouble getting transportation to medical appointments?: No     How do you normally get to and from your appointments?: Family/Friend   [5]   Current Outpatient Medications   Medication Sig Dispense Refill    Meloxicam 15 MG Oral Tab Take 1 tablet (15 mg total) by mouth daily. 30 tablet 1    gabapentin 100 MG Oral Cap Take 1 capsule (100 mg total) by mouth 3 (three) times daily. 90 capsule 0    benzonatate 200 MG Oral Cap Take 1 capsule (200 mg total) by mouth 3 (three) times daily as needed for cough. 30 capsule 0    Azelastine HCl (ASTEPRO) 0.15 % Nasal Solution 1 spray by Nasal route 2 (two) times daily. 1 each 0    lidocaine 5 % External Patch Place 1 patch onto the skin daily. 10 patch 0

## 2025-06-26 ENCOUNTER — OFFICE VISIT (OUTPATIENT)
Dept: SURGERY | Facility: CLINIC | Age: 50
End: 2025-06-26
Payer: COMMERCIAL

## 2025-06-26 DIAGNOSIS — R30.0 DYSURIA: Primary | ICD-10-CM

## 2025-06-26 PROCEDURE — 99214 OFFICE O/P EST MOD 30 MIN: CPT | Performed by: NURSE PRACTITIONER

## 2025-06-30 ENCOUNTER — TELEPHONE (OUTPATIENT)
Dept: PHYSICAL THERAPY | Facility: HOSPITAL | Age: 50
End: 2025-06-30

## 2025-07-01 ENCOUNTER — OFFICE VISIT (OUTPATIENT)
Dept: PHYSICAL THERAPY | Facility: HOSPITAL | Age: 50
End: 2025-07-01
Attending: PHYSICAL MEDICINE & REHABILITATION
Payer: COMMERCIAL

## 2025-07-01 DIAGNOSIS — M47.816 LUMBAR SPONDYLOSIS: ICD-10-CM

## 2025-07-01 DIAGNOSIS — D16.9 OSTEOCHONDROMA: ICD-10-CM

## 2025-07-01 DIAGNOSIS — M41.25 OTHER IDIOPATHIC SCOLIOSIS, THORACOLUMBAR REGION: ICD-10-CM

## 2025-07-01 DIAGNOSIS — M51.369 BULGE OF LUMBAR DISC WITHOUT MYELOPATHY: ICD-10-CM

## 2025-07-01 DIAGNOSIS — M25.551 BILATERAL HIP PAIN: Primary | ICD-10-CM

## 2025-07-01 DIAGNOSIS — M21.851 HIP DYSPLASIA, ACQUIRED, RIGHT: ICD-10-CM

## 2025-07-01 DIAGNOSIS — M79.10 MYALGIA: ICD-10-CM

## 2025-07-01 DIAGNOSIS — M48.061 LUMBAR FORAMINAL STENOSIS: ICD-10-CM

## 2025-07-01 DIAGNOSIS — M54.16 LUMBAR RADICULOPATHY: ICD-10-CM

## 2025-07-01 DIAGNOSIS — M47.816 FACET SYNDROME, LUMBAR: ICD-10-CM

## 2025-07-01 DIAGNOSIS — M25.552 BILATERAL HIP PAIN: Primary | ICD-10-CM

## 2025-07-01 DIAGNOSIS — M54.59 MECHANICAL LOW BACK PAIN: ICD-10-CM

## 2025-07-01 PROCEDURE — 97110 THERAPEUTIC EXERCISES: CPT | Performed by: PHYSICAL THERAPIST

## 2025-07-01 PROCEDURE — 97162 PT EVAL MOD COMPLEX 30 MIN: CPT | Performed by: PHYSICAL THERAPIST

## 2025-07-02 NOTE — PROGRESS NOTES
SPINE EVALUATION:     Diagnosis:   Bilateral hip pain (M25.551,M25.552)  Other idiopathic scoliosis, thoracolumbar region (M41.25)  Osteochondroma (D16.9)  Myalgia (M79.10)  Lumbar radiculopathy (M54.16)  Facet syndrome, lumbar (M47.816)  Mechanical low back pain (M54.59)  Lumbar spondylosis (M47.816)  Lumbar foraminal stenosis (M48.061)  Bulge of lumbar disc without myelopathy (M51.369)  Hip dysplasia, acquired, right (M21.851) Patient:  Herson Moya (50 year old, male)        Referring Provider: Alex Behar  Today's Date   7/1/2025    Precautions:  Other (use comment) (thoracic spine fusion, scoliosis)   Date of Evaluation: 07/01/25  Next MD visit: No data recorded  Date of Surgery: NA     PATIENT SUMMARY     Summary of chief complaints: L LE pain  History of current condition: insidious onset, pain for 2 years   Pain level: current 7 /10, at best 4 /10, at worst 9 /10  Description of symptoms: gets shootting pain down his L LE, states the pain comes and goes.  Previous did exercises but they didn't help. Get spasms in his back.  Dex wake up at night with apin.  Walking is better than sitting.  Gotten worse over time   Occupation: IT computers - walks a lot   Leisure activities/Hobbies: limits his ability to walking, biking and do yard work   Prior level of function: Independents in all activities  Current limitations: limited aiblity to walk, stand, sleep  Pt goals: decrease his pain  Red flag signs/symptoms: Pt denies dizziness, drop attacks, dysphagia, dysarthria, diplopia; Pt denies changes in bowel/bladder function, saddle anesthesia; Pt denies pain that wakes in sleep, fever, recent trauma, history of CA, pain unchanged with movement/activity    Past medical history was reviewed with Herson.  Significant findings include: Alfa secondary to scoliosis in his 30's  Imaging/Tests: see chart   Herson  has a past medical history of Allergic rhinitis, Priapism, Scoliosis, and Urethral stricture.  He  has a past  surgical history that includes cystoscopy,dil urethral stricture (09/24/2008); cystourethroscopy (05/25/2012); cystourethroscopy (11/22/2013); cystoscopy,dil urethral stricture (07/01/2014); neck/chest procedure unlisted (2000); spine fusion,poster,7-12 sgmts (2004); cystourethroscopy (11/22/2016); cystourethroscopy (11/14/2017); cystourethroscopy (04/03/2020); back surgery (6/2002); and other surgical history.    ASSESSMENT  Herson presents to physical therapy evaluation with primary c/o L LE pain. The results of the objective tests and measures show decreaesd lumbar ROM, decreaesd core pressure management, decrease ability to tolerate sitting. Functional deficits include but are not limited to limited aiblity to walk, stand, sleep. Signs and symptoms are consistent with diagnosis of Bilateral hip pain (M25.551,M25.552)  Other idiopathic scoliosis, thoracolumbar region (M41.25)  Osteochondroma (D16.9)  Myalgia (M79.10)  Lumbar radiculopathy (M54.16)  Facet syndrome, lumbar (M47.816)  Mechanical low back pain (M54.59)  Lumbar spondylosis (M47.816)  Lumbar foraminal stenosis (M48.061)  Bulge of lumbar disc without myelopathy (M51.369)  Hip dysplasia, acquired, right (M21.851). Pt and PT discussed evaluation findings, pathology, POC and HEP.  Pt voiced understanding and performs HEP correctly without reported pain. Skilled Physical Therapy is medically necessary to address the above impairments and reach functional goals.    OBJECTIVE:      Musculoskeletal:  Observation/Posture: forward head posture; scapular abduction; flattened upper thoracic kyphosis; rounded shoulders; posterior pelvic tilt; pelvic asymmetry       ROM and Strength:  (* denotes performed with pain)  Trunk ROM MMT (-/5)     Flex limited 50% secondary to fusion       Ext limited 25%      R L R L     Side bend WFL WFL         Rotation side glide R decreased 25% side glide L WFL       ,   Myotome MMT   MMT (-/5)    R L   Shoulder Abd (C5)       Elbow  Ext (C7)       Elbow Flex (C6)       Wrist Flex (C7)       Wrist Ext (C6)       Thumb Ext (C8)       Digit Flex (C8)       Interossei (T1)       Hip Flex (L2) 5 5   Knee Ext (L3) 5 5   Ankle DF (L4) 5 5   Ankle PF (S1) 5 5   Grt Toe Ext (L5) 5 5         Neurological:  Sensation: grossly intact to light touch BRETT UE/LE     Deep Tendon Reflexes: grossly intact BRETT UE/LE       Peripheral Neurodynamic: WNL except     Balance and Functional Mobility:  Gait: pt ambulates on level ground with decreased stance phase; decreased foot clearance; decreased arm swing; stooped posture/forward lean.          Today's Treatment and Response:   Pt education was provided on exam findings, treatment diagnosis, treatment plan, expectations, and prognosis.    Today's Treatment       7/1/2025   Spine Treatment   Therapeutic Exercise - side glides L  - education on centralization of symptoms and precautions for side glides   Therapeutic Exercise Minutes 15   Evaluation Minutes 20   Total Time Of Timed Procedures 15   Total Time Of Service-Based Procedures 20   Total Treatment Time 35   HEP - side glides L        Patient was instructed in and issued a HEP for: - side glides L    Charges:  PT EVAL:  , Eval mod, TE1  In agreement with evaluation findings and clinical rationale, this evaluation involved MODERATE COMPLEXITY decision making due to 1-2 personal factors/comorbidities, 3 or more body structures involved/activity limitations, and evolving symptoms as documented in the evaluation.                                                                         PLAN OF CARE:      Goals: (to be met in 10 visits)    The pt will be independent in his HEP  The pt will report 50% decrease in pain  The pt will be able to sit through a full day of work without an increase in pain  The pt will be able to  an object off the floor without an increase in pain     Frequency / Duration: Patient will be seen 1x/week or a total of 10  visits over a 90  day period. Treatment will include: Gait training; Manual Therapy; Neuromuscular Re-education; Self-Care Home Management; Therapeutic Activities; Therapeutic Exercise; Home Exercise Program instruction; Patient/Family Education    Education or treatment limitation: None   Rehab Potential: good     LEFS Score  LEFS Score: (Patient-Rptd) 91.25 % (7/1/2025  3:52 PM)      Patient/Family/Caregiver was advised of these findings, precautions, and treatment options and has agreed to actively participate in planning and for this course of care.    Thank you for your referral. Please co-sign or sign and return this letter via fax as soon as possible to [phone]. If you have any questions, please contact me at Dept: [phone]    Sincerely,  Electronically signed by therapist: Brenda Croft PT  Physician's certification required: Yes  I certify the need for these services furnished under this plan of treatment and while under my care.    X___________________________________________________ Date____________________    Certification From: 7/1/2025  To: 9/29/2025

## 2025-07-21 ENCOUNTER — LAB ENCOUNTER (OUTPATIENT)
Dept: LAB | Age: 50
End: 2025-07-21
Attending: FAMILY MEDICINE
Payer: COMMERCIAL

## 2025-07-21 ENCOUNTER — TELEPHONE (OUTPATIENT)
Facility: CLINIC | Age: 50
End: 2025-07-21

## 2025-07-21 ENCOUNTER — RESULTS FOLLOW-UP (OUTPATIENT)
Facility: CLINIC | Age: 50
End: 2025-07-21

## 2025-07-21 ENCOUNTER — OFFICE VISIT (OUTPATIENT)
Facility: CLINIC | Age: 50
End: 2025-07-21
Payer: COMMERCIAL

## 2025-07-21 VITALS
HEART RATE: 78 BPM | BODY MASS INDEX: 23.64 KG/M2 | OXYGEN SATURATION: 99 % | SYSTOLIC BLOOD PRESSURE: 118 MMHG | HEIGHT: 68 IN | DIASTOLIC BLOOD PRESSURE: 62 MMHG | WEIGHT: 156 LBS

## 2025-07-21 DIAGNOSIS — L29.9 PRURITUS: Primary | ICD-10-CM

## 2025-07-21 DIAGNOSIS — M62.830 BACK SPASM: ICD-10-CM

## 2025-07-21 DIAGNOSIS — E55.9 VITAMIN D DEFICIENCY: ICD-10-CM

## 2025-07-21 DIAGNOSIS — L29.9 PRURITUS: ICD-10-CM

## 2025-07-21 LAB
ALBUMIN SERPL-MCNC: 4.7 G/DL (ref 3.2–4.8)
ALBUMIN/GLOB SERPL: 2 {RATIO} (ref 1–2)
ALP LIVER SERPL-CCNC: 60 U/L (ref 45–117)
ALT SERPL-CCNC: 29 U/L (ref 10–49)
ANION GAP SERPL CALC-SCNC: 8 MMOL/L (ref 0–18)
AST SERPL-CCNC: 19 U/L (ref ?–34)
BILIRUB SERPL-MCNC: 0.9 MG/DL (ref 0.3–1.2)
BUN BLD-MCNC: 16 MG/DL (ref 9–23)
BUN/CREAT SERPL: 19.8 (ref 10–20)
CALCIUM BLD-MCNC: 9.2 MG/DL (ref 8.7–10.4)
CHLORIDE SERPL-SCNC: 101 MMOL/L (ref 98–112)
CO2 SERPL-SCNC: 28 MMOL/L (ref 21–32)
CREAT BLD-MCNC: 0.81 MG/DL (ref 0.7–1.3)
EGFRCR SERPLBLD CKD-EPI 2021: 107 ML/MIN/1.73M2 (ref 60–?)
EST. AVERAGE GLUCOSE BLD GHB EST-MCNC: 97 MG/DL (ref 68–126)
FASTING STATUS PATIENT QL REPORTED: NO
GLOBULIN PLAS-MCNC: 2.3 G/DL (ref 2–3.5)
GLUCOSE BLD-MCNC: 90 MG/DL (ref 70–99)
HBA1C MFR BLD: 5 % (ref ?–5.7)
OSMOLALITY SERPL CALC.SUM OF ELEC: 285 MOSM/KG (ref 275–295)
POTASSIUM SERPL-SCNC: 3.6 MMOL/L (ref 3.5–5.1)
PROT SERPL-MCNC: 7 G/DL (ref 5.7–8.2)
SODIUM SERPL-SCNC: 137 MMOL/L (ref 136–145)
VIT D+METAB SERPL-MCNC: 20.9 NG/ML (ref 30–100)

## 2025-07-21 PROCEDURE — 3008F BODY MASS INDEX DOCD: CPT | Performed by: FAMILY MEDICINE

## 2025-07-21 PROCEDURE — 3078F DIAST BP <80 MM HG: CPT | Performed by: FAMILY MEDICINE

## 2025-07-21 PROCEDURE — 3074F SYST BP LT 130 MM HG: CPT | Performed by: FAMILY MEDICINE

## 2025-07-21 PROCEDURE — 99214 OFFICE O/P EST MOD 30 MIN: CPT | Performed by: FAMILY MEDICINE

## 2025-07-21 PROCEDURE — 82306 VITAMIN D 25 HYDROXY: CPT | Performed by: FAMILY MEDICINE

## 2025-07-21 PROCEDURE — 80053 COMPREHEN METABOLIC PANEL: CPT | Performed by: FAMILY MEDICINE

## 2025-07-21 PROCEDURE — 86618 LYME DISEASE ANTIBODY: CPT | Performed by: FAMILY MEDICINE

## 2025-07-21 PROCEDURE — 83036 HEMOGLOBIN GLYCOSYLATED A1C: CPT | Performed by: FAMILY MEDICINE

## 2025-07-21 RX ORDER — CYCLOBENZAPRINE HCL 10 MG
10 TABLET ORAL 3 TIMES DAILY PRN
Qty: 90 TABLET | Refills: 0 | Status: SHIPPED | OUTPATIENT
Start: 2025-07-21

## 2025-07-21 NOTE — TELEPHONE ENCOUNTER
Condition update:  Patient called to report he had seen a dermatologist about a month ago for a problem/rash  he had with his back, breaking out with itching, states he was told \"it was due to his lab levels being low.\"  Also getting sensation of electrical shocks in his back which he calls \"spasms.\"  He has been seeing Physical Therapy for hip/leg pain.  He is wanting to be seen to check his labs.    Plan:  Scheduled open appointment slot today for evaluation.  Future Appointments   Date Time Provider Department Center   7/21/2025 12:30 PM Deirdre Flores MD [facility]   7/22/2025  3:45 PM Brenda Croft, PT [facility]   8/1/2025  3:45 PM Brenda Croft, PT [facility]   8/5/2025  3:45 PM Brenda Croft, PT [facility]   8/12/2025  3:45 PM Brenda Croft, PT [facility]   8/15/2025  3:45 PM Brenda Croft, PT [facility]   8/19/2025  3:45 PM Brenda Croft, PT [facility]   8/22/2025  3:45 PM Brenda Croft, PT [facility]   8/26/2025  8:00 AM Derek Alarcon MD [facility]       Care Everywhere updated, please see office visit with Cheikh on 6/13/2025, he was prescribed Medrol dosepak and cetirizine 10 mg.    Assessment/Plan   Assessment/Plan: Rash/symptoms likely allergic component in nature as it is very itchy. Unknown trigger however. Will do trial of over-the-counter Zyrtec up to twice daily. If patient fails trial of over-the-counter antihistamine such as Zyrtec and skin hydration measures such as Vanicream will start Medrol Dosepak trial. Advised close follow-up with dermatology and was provided with the acute care Derm number.

## 2025-07-21 NOTE — PROGRESS NOTES
Subjective:   Herson Moya is a 50 year old male who presents for Rash (And pain in back since Friday )     Patient presents for follow up rash. Has been noting itching of upper back that will extend down into right leg. Has seen dermatologist for this. Who trialed patient on cerave and other prescription cream. Patient has started taking zytrec without  relief. Patient lives with girlfriend who bush not have any itching or rash. No recent travel. No new pets. Dermatologist recommended getting labs updated. Patient is also wondering about lyme disease.     Patient has also been noting back spasms for the past 3 days.  Just woke up with them.  No previous injury or heavy lifting.     History/Other:    Chief Complaint Reviewed and Verified  Nursing Notes Reviewed and   Verified  Tobacco Reviewed  Allergies Reviewed  Medications Reviewed    Problem List Reviewed  Medical History Reviewed  Surgical History   Reviewed  Family History Reviewed  Social History Reviewed         Tobacco:  He has never smoked tobacco.    Current Medications[1]      Review of Systems:  Review of Systems   Constitutional: Negative.    Respiratory: Negative.     Cardiovascular: Negative.    Gastrointestinal: Negative.    Musculoskeletal:  Positive for back pain.   Skin:         +pruritus   Neurological: Negative.          Objective:   /62   Pulse 78   Ht 5' 8\" (1.727 m)   Wt 156 lb (70.8 kg)   SpO2 99%   BMI 23.72 kg/m²  Estimated body mass index is 23.72 kg/m² as calculated from the following:    Height as of this encounter: 5' 8\" (1.727 m).    Weight as of this encounter: 156 lb (70.8 kg).  Physical Exam  Vitals and nursing note reviewed.   Constitutional:       General: He is not in acute distress.     Appearance: Normal appearance.   HENT:      Head: Normocephalic and atraumatic.   Eyes:      General:         Right eye: No discharge.         Left eye: No discharge.      Comments: Extraocular eye movements grossly intact    Pulmonary:      Effort: Pulmonary effort is normal.   Musculoskeletal:      Comments: Normal gait   Skin:     Findings: No rash.      Comments: No visible rash, positive excoriations on upper thoracic back    Neurological:      General: No focal deficit present.      Mental Status: He is alert. Mental status is at baseline.   Psychiatric:         Mood and Affect: Mood normal.         Behavior: Behavior normal.           Assessment & Plan:   1. Pruritus (Primary)  -     Comp Metabolic Panel (14); Future; Expected date: 07/21/2025  -     Lyme Disease, Total Ab W Rflx; Future; Expected date: 07/21/2025  -     Hemoglobin A1C; Future; Expected date: 07/21/2025    No visible rash noted in area of pruritus.  However do see some mild excoriations where patient has been scratching.  Counseled patient to follow back up with dermatology to discuss next Epson treatment.  Ordered labs as listed above.  Also counseled patient on trial of Medrol Dosepak.  After discussing side effects patient would like to hold off for now    2. Back spasm  -     Cyclobenzaprine HCl; Take 1 tablet (10 mg total) by mouth 3 (three) times daily as needed for Muscle spasms.  Dispense: 90 tablet; Refill: 0    Counseled patient to begin stretching regimen.  Also sent in cyclobenzaprine as written.  Discussed side effects of sedation.  Counseled patient to trial before bed.  If wakes up with sedation do not take during the day and only use at night      Return if symptoms worsen or fail to improve.    Deirdre Flores MD, 7/21/2025, 12:41 PM          [1]   Current Outpatient Medications   Medication Sig Dispense Refill    cyclobenzaprine 10 MG Oral Tab Take 1 tablet (10 mg total) by mouth 3 (three) times daily as needed for Muscle spasms. 90 tablet 0    Meloxicam 15 MG Oral Tab Take 1 tablet (15 mg total) by mouth daily. 30 tablet 1    benzonatate 200 MG Oral Cap Take 1 capsule (200 mg total) by mouth 3 (three) times daily as needed for cough. 30 capsule 0     Azelastine HCl (ASTEPRO) 0.15 % Nasal Solution 1 spray by Nasal route 2 (two) times daily. 1 each 0    lidocaine 5 % External Patch Place 1 patch onto the skin daily. 10 patch 0

## 2025-07-22 ENCOUNTER — OFFICE VISIT (OUTPATIENT)
Dept: PHYSICAL THERAPY | Facility: HOSPITAL | Age: 50
End: 2025-07-22
Attending: PHYSICAL MEDICINE & REHABILITATION
Payer: COMMERCIAL

## 2025-07-22 ENCOUNTER — PATIENT MESSAGE (OUTPATIENT)
Facility: CLINIC | Age: 50
End: 2025-07-22

## 2025-07-22 PROCEDURE — 97530 THERAPEUTIC ACTIVITIES: CPT | Performed by: PHYSICAL THERAPIST

## 2025-07-22 PROCEDURE — 97112 NEUROMUSCULAR REEDUCATION: CPT | Performed by: PHYSICAL THERAPIST

## 2025-07-22 NOTE — TELEPHONE ENCOUNTER
Patient called back, verified Name and . Following up on test results. Reviewed Dr. Flores's message below, verbalized understanding and had no further questions at this time.      Your diabetes testing came back normal.  Your liver kidneys and electrolytes are also all normal.  I am still waiting on the Lyme test     Dr. Go   Written by Deirdre Flores MD on 2025  6:01 PM CDT  Seen by patient Herson Moya on 2025  8:40 AM

## 2025-07-22 NOTE — PROGRESS NOTES
Patient: Herson Moya (50 year old, male) Referring Provider:  Insurance:   Diagnosis: [address]  [employer] [place]   Date of Surgery: NA Next MD visit:  N/A   Precautions:  Other (use comment) (thoracic spine fusion, scoliosis) No data recorded Referral Information:    Date of Evaluation: Req: 0, Auth: 0, Exp:     07/01/25 POC Auth Visits:  10       Today's Date   7/22/2025    Subjective  Reports he is having upper back spasms tht started Friday.  States his LB is about the same. Sleeping ok.       Pain: 7/10     Objective               Assessment  No adverse effect to treatment.  The pt displays a hyperextended position and has difficulty flattening hs back when supine.  Needs further compression in the abdominals and expansion in the chest wall. The pt reported relief of symptoms frm the sesion.    Goals (to be met in 10 visits)   The pt will be independent in his HEP  The pt will report 50% decrease in pain  The pt will be able to sit through a full day of work without an increase in pain  The pt will be able to  an object off the floor without an increase in pain       Plan  Cotn[facility] PT - progress IO/TA activities as tolerated    Treatment Last 4 Visits  Treatment Day: 2       7/1/2025 7/22/2025   Spine Treatment   Therapeutic Exercise - side glides L  - education on centralization of symptoms and precautions for side glides    Neuro Re-Education  - adductor squeeze with exhale in sitting  - adductor squeeze with exhalation in supine  - supine bar reach  - supine T8 extension  - PME with subscapularis  - modified all belly lift   Therapeutic Activity  - education on need for compression with the abdomen and expansion in the chest wall to decrease muscle spasms    - education on sitting with feet/knees at 90 degrees at his desk with support under his forearms   Therapeutic Exercise Minutes 15    Neuro Re-Educ Minutes  38   Therapeutic Activity Minutes  8   Evaluation Minutes 20    Total Time Of Timed  Procedures 15 46   Total Time Of Service-Based Procedures 20 0   Total Treatment Time 35 46   HEP - side glides L - adductor squeeze with exhale in sitting  - adductor squeeze with exhalation in supine  - supine bar reach  - supine T8 extension        HEP  - adductor squeeze with exhale in sitting  - adductor squeeze with exhalation in supine  - supine bar reach  - supine T8 extension    Charges  NM3, TA1

## 2025-07-23 ENCOUNTER — PATIENT MESSAGE (OUTPATIENT)
Facility: CLINIC | Age: 50
End: 2025-07-23

## 2025-07-23 LAB — B BURGDOR IGG+IGM SER QL: NEGATIVE

## 2025-08-01 ENCOUNTER — OFFICE VISIT (OUTPATIENT)
Dept: PHYSICAL THERAPY | Facility: HOSPITAL | Age: 50
End: 2025-08-01
Attending: PHYSICAL MEDICINE & REHABILITATION

## 2025-08-01 PROCEDURE — 97112 NEUROMUSCULAR REEDUCATION: CPT | Performed by: PHYSICAL THERAPIST

## 2025-08-01 PROCEDURE — 97530 THERAPEUTIC ACTIVITIES: CPT | Performed by: PHYSICAL THERAPIST

## 2025-08-05 ENCOUNTER — TELEPHONE (OUTPATIENT)
Dept: PHYSICAL THERAPY | Facility: HOSPITAL | Age: 50
End: 2025-08-05

## 2025-08-12 ENCOUNTER — APPOINTMENT (OUTPATIENT)
Dept: PHYSICAL THERAPY | Facility: HOSPITAL | Age: 50
End: 2025-08-12
Attending: PHYSICAL MEDICINE & REHABILITATION

## 2025-08-15 ENCOUNTER — APPOINTMENT (OUTPATIENT)
Dept: PHYSICAL THERAPY | Facility: HOSPITAL | Age: 50
End: 2025-08-15
Attending: PHYSICAL MEDICINE & REHABILITATION

## 2025-08-19 ENCOUNTER — APPOINTMENT (OUTPATIENT)
Dept: PHYSICAL THERAPY | Facility: HOSPITAL | Age: 50
End: 2025-08-19
Attending: PHYSICAL MEDICINE & REHABILITATION

## 2025-08-22 ENCOUNTER — APPOINTMENT (OUTPATIENT)
Dept: PHYSICAL THERAPY | Facility: HOSPITAL | Age: 50
End: 2025-08-22
Attending: PHYSICAL MEDICINE & REHABILITATION

## 2025-08-26 ENCOUNTER — OFFICE VISIT (OUTPATIENT)
Dept: INTERNAL MEDICINE CLINIC | Facility: CLINIC | Age: 50
End: 2025-08-26

## 2025-08-26 VITALS
HEART RATE: 100 BPM | TEMPERATURE: 99 F | SYSTOLIC BLOOD PRESSURE: 118 MMHG | DIASTOLIC BLOOD PRESSURE: 81 MMHG | RESPIRATION RATE: 20 BRPM | HEIGHT: 68 IN | WEIGHT: 159 LBS | OXYGEN SATURATION: 98 % | BODY MASS INDEX: 24.1 KG/M2

## 2025-08-26 DIAGNOSIS — M79.89 BILATERAL HAND SWELLING: Primary | ICD-10-CM

## 2025-08-26 DIAGNOSIS — M25.571 ACUTE RIGHT ANKLE PAIN: ICD-10-CM

## 2025-08-26 PROCEDURE — 3079F DIAST BP 80-89 MM HG: CPT | Performed by: NURSE PRACTITIONER

## 2025-08-26 PROCEDURE — 3074F SYST BP LT 130 MM HG: CPT | Performed by: NURSE PRACTITIONER

## 2025-08-26 PROCEDURE — 99213 OFFICE O/P EST LOW 20 MIN: CPT | Performed by: NURSE PRACTITIONER

## 2025-08-26 PROCEDURE — 3008F BODY MASS INDEX DOCD: CPT | Performed by: NURSE PRACTITIONER

## 2025-08-26 RX ORDER — DESONIDE 0.5 MG/ML
LOTION TOPICAL
COMMUNITY
Start: 2025-07-30

## (undated) DIAGNOSIS — R73.9 HYPERGLYCEMIA: Primary | ICD-10-CM

## (undated) NOTE — LETTER
Date & Time: 7/16/2024, 9:37 AM  Patient: Herson Moya  Encounter Provider(s):    Gisel Muro APRN       To Whom It May Concern:    Herson Moya was seen and treated in our department on 7/16/2024. He should not return to work until 07/18/2024 .    If you have any questions or concerns, please do not hesitate to call.    HEATHER Paul    _____________________________  Physician/APC Signature

## (undated) NOTE — MR AVS SNAPSHOT
[address] at [facility]  [address] [facility], [address] [place] [address]  [phone]               Thank you for choosing us for your health care visit with Jelly Mc DO.   We are glad to serve you and happy to provide you with th · Plant oils in poison ivy, oak, and sumac  · Chemicals in household , solvents, and glue  · Chemicals in makeup, soap, laundry detergent, perfume, acne cream, and hair products  · Certain medicines, such as neomycin, bacitracin, benzocaine, and th Talk with your healthcare provider about what may have caused your contact dermatitis. Patch testing may help you figure out what caused the rash so you can avoid further contact with it.  Once you learn what caused your rash, make sure to avoid that substa Hours:  24-hours Phone:  [phone]    - triamcinolone acetonide 0.1 % Crea            MyChart     Visit MyChart  You can access your MyChart to more actively manage your health care and view more details from this visit by going to [email or web address]

## (undated) NOTE — MR AVS SNAPSHOT
[address] at [facility]  [address] [facility], [address] [place] [address]  [phone]               Thank you for choosing us for your health care visit with Brandon Taylor DO.   We are glad to serve you and happy to provide you with th